# Patient Record
Sex: FEMALE | Race: WHITE | NOT HISPANIC OR LATINO | Employment: OTHER | URBAN - METROPOLITAN AREA
[De-identification: names, ages, dates, MRNs, and addresses within clinical notes are randomized per-mention and may not be internally consistent; named-entity substitution may affect disease eponyms.]

---

## 2017-03-31 DIAGNOSIS — M79.7 FIBROMYALGIA: ICD-10-CM

## 2017-03-31 DIAGNOSIS — M19.90 ARTHRITIS: ICD-10-CM

## 2017-04-03 DIAGNOSIS — M19.90 ARTHRITIS: ICD-10-CM

## 2017-04-03 DIAGNOSIS — M79.7 FIBROMYALGIA: ICD-10-CM

## 2017-04-03 RX ORDER — MELOXICAM 15 MG/1
15 TABLET ORAL DAILY
Qty: 30 TABLET | Refills: 1 | Status: SHIPPED | OUTPATIENT
Start: 2017-04-03 | End: 2018-11-19

## 2017-04-05 RX ORDER — MELOXICAM 15 MG/1
TABLET ORAL
Qty: 30 TABLET | Refills: 0 | Status: SHIPPED | OUTPATIENT
Start: 2017-04-05 | End: 2017-07-28 | Stop reason: SDUPTHER

## 2017-05-30 DIAGNOSIS — M19.90 ARTHRITIS: ICD-10-CM

## 2017-05-30 DIAGNOSIS — M79.7 FIBROMYALGIA: ICD-10-CM

## 2017-05-30 RX ORDER — VENLAFAXINE HYDROCHLORIDE 75 MG/1
CAPSULE, EXTENDED RELEASE ORAL
Qty: 30 CAPSULE | Refills: 3 | Status: SHIPPED | OUTPATIENT
Start: 2017-05-30 | End: 2017-11-06 | Stop reason: SDUPTHER

## 2017-06-27 RX ORDER — HYDROXYZINE HYDROCHLORIDE 25 MG/1
25 TABLET, FILM COATED ORAL EVERY 6 HOURS PRN
Qty: 30 TABLET | Refills: 1 | Status: SHIPPED | OUTPATIENT
Start: 2017-06-27 | End: 2018-05-01

## 2017-07-28 DIAGNOSIS — M19.90 ARTHRITIS: ICD-10-CM

## 2017-07-28 DIAGNOSIS — M79.7 FIBROMYALGIA: ICD-10-CM

## 2017-07-28 RX ORDER — MELOXICAM 15 MG/1
15 TABLET ORAL DAILY
Qty: 30 TABLET | Refills: 1 | Status: SHIPPED | OUTPATIENT
Start: 2017-07-28 | End: 2018-04-24 | Stop reason: SDUPTHER

## 2017-08-14 RX ORDER — ZOLPIDEM TARTRATE 10 MG/1
TABLET ORAL
Qty: 90 TABLET | Refills: 0 | Status: SHIPPED | OUTPATIENT
Start: 2017-08-14 | End: 2018-08-01 | Stop reason: SDUPTHER

## 2017-11-06 DIAGNOSIS — M79.7 FIBROMYALGIA: ICD-10-CM

## 2017-11-06 DIAGNOSIS — M19.90 ARTHRITIS: ICD-10-CM

## 2017-11-06 RX ORDER — VENLAFAXINE HYDROCHLORIDE 75 MG/1
CAPSULE, EXTENDED RELEASE ORAL
Qty: 30 CAPSULE | Refills: 1 | Status: SHIPPED | OUTPATIENT
Start: 2017-11-06 | End: 2017-12-18 | Stop reason: SDUPTHER

## 2017-12-18 DIAGNOSIS — M79.7 FIBROMYALGIA: ICD-10-CM

## 2017-12-18 DIAGNOSIS — M19.90 ARTHRITIS: ICD-10-CM

## 2017-12-18 RX ORDER — VENLAFAXINE HYDROCHLORIDE 75 MG/1
CAPSULE, EXTENDED RELEASE ORAL
Qty: 30 CAPSULE | Refills: 1 | Status: SHIPPED | OUTPATIENT
Start: 2017-12-18 | End: 2018-05-01

## 2018-04-24 ENCOUNTER — OFFICE VISIT (OUTPATIENT)
Dept: NEUROSURGERY | Facility: CLINIC | Age: 68
End: 2018-04-24

## 2018-04-24 VITALS
HEIGHT: 63 IN | SYSTOLIC BLOOD PRESSURE: 100 MMHG | BODY MASS INDEX: 29.23 KG/M2 | DIASTOLIC BLOOD PRESSURE: 56 MMHG | WEIGHT: 165 LBS

## 2018-04-24 DIAGNOSIS — M54.10 RADICULOPATHY OF LEG: Primary | ICD-10-CM

## 2018-04-24 PROCEDURE — 99203 OFFICE O/P NEW LOW 30 MIN: CPT | Performed by: NEUROLOGICAL SURGERY

## 2018-04-24 RX ORDER — GABAPENTIN 300 MG/1
CAPSULE ORAL
Qty: 120 CAPSULE | Refills: 3 | Status: SHIPPED | OUTPATIENT
Start: 2018-04-24 | End: 2018-05-01 | Stop reason: SDUPTHER

## 2018-04-24 NOTE — PROGRESS NOTES
Subjective   Patient ID: Malissa Silva is a 67 y.o. female who is being seen for consultation today at the request of Nestor Weeks MD for sciatica. She had an MRI of the lumbar at [facility] on 2/13/18. She presents unaccompanied.    History of Present Illness 68 yo lady with history of hip pain and LLE radiculopathy.  This started in December.  She went to the ER several times in last 4 months.  She reports it got better.  She received a medrol dose back with good efficacy.  She reports her pain is a 2-3/10.  SHe denies b/b issues.  She did not perform PT.  No ELENA's.  Her pain in her left leg is exacerbated by sitting.  She is retired.  She is a non smoker.  SHe reports no weakness.        The following portions of the patient's history were reviewed and updated as appropriate: allergies, current medications, past family history, past medical history, past social history, past surgical history and problem list.    Review of Systems   Constitutional: Negative for chills and fever.   Respiratory: Negative for cough and shortness of breath.    Cardiovascular: Negative for chest pain, palpitations and leg swelling.   Gastrointestinal: Negative for abdominal pain and constipation.   Genitourinary: Negative for difficulty urinating and enuresis.   Musculoskeletal: Positive for back pain. Negative for gait problem and neck pain.   Skin: Negative for rash.   Neurological: Negative for weakness and numbness (or tingling).   Hematological: Does not bruise/bleed easily.   Psychiatric/Behavioral: Negative for sleep disturbance.       Objective   Physical Exam   Constitutional: She is oriented to person, place, and time.   Neurological: She is oriented to person, place, and time. Gait normal.   Reflex Scores:       Tricep reflexes are 1+ on the right side and 1+ on the left side.       Bicep reflexes are 1+ on the right side and 1+ on the left side.       Brachioradialis reflexes are 1+ on the right side and 1+ on the left side.        Patellar reflexes are 1+ on the right side and 1+ on the left side.       Achilles reflexes are 1+ on the right side and 1+ on the left side.    Neurologic Exam     Mental Status   Oriented to person, place, and time.     Motor Exam   Muscle bulk: normal  Overall muscle tone: normal    Strength   Right deltoid: 5/5  Left deltoid: 5/5  Right biceps: 5/5  Left biceps: 5/5  Right triceps: 5/5  Left triceps: 5/5  Right wrist extension: 5/5  Left wrist extension: 5/5  Right interossei: 5/5  Left interossei: 5/5  Right iliopsoas: 5/5  Left iliopsoas: 5/5  Right quadriceps: 5/5  Left quadriceps: 5/5  Right hamstrin/5  Left hamstrin/5  Right anterior tibial: 5/5  Left anterior tibial: 5/5  Right gastroc: 5/5  Left gastroc: 5/5    Sensory Exam   Right arm light touch: normal  Left arm light touch: normal  Right leg light touch: normal  Left leg light touch: normal  Right arm pinprick: normal  Left arm pinprick: normal  Right leg pinprick: normal  Left leg pinprick: normal    Gait, Coordination, and Reflexes     Gait  Gait: normal    Reflexes   Right brachioradialis: 1+  Left brachioradialis: 1+  Right biceps: 1+  Left biceps: 1+  Right triceps: 1+  Left triceps: 1+  Right patellar: 1+  Left patellar: 1+  Right achilles: 1+  Left achilles: 1+  Right Polanco: absent  Left Polanco: absent  Right ankle clonus: absent  Left ankle clonus: absentNo SLR, no xSLR     Antalgic hips bilaterally  Pain with int/ext rotation of left hip      Assessment/Plan   Independent Review of Radiographic Studies:    SHe has a left L5S1 disc herniation which deforms her left S1 disc.  SHe has diffuse moderate ddd as well.    Medical Decision Making:  I offered her surgery for her disc herniation.  She has some symptoms but is improved.  She has clear hip pathology as well.  We discussed gabapentin as an alternative.  She is allergic to dye and if red she cannot get. We discussed red flags and reasons to call and seek eval.  SHe will  transition her care back to her PCP in terms of the gabapentin.      Malissa was seen today for back pain.    Diagnoses and all orders for this visit:    Radiculopathy of leg    Other orders  -     gabapentin (NEURONTIN) 300 MG capsule; Take 1 qhs for 3-5 days, then bid for 3-5 days, then tid and stay on this dose      No Follow-up on file.

## 2018-04-30 ENCOUNTER — TELEPHONE (OUTPATIENT)
Dept: NEUROSURGERY | Facility: CLINIC | Age: 68
End: 2018-04-30

## 2018-04-30 NOTE — TELEPHONE ENCOUNTER
"This patient saw Dr. Rain on 4/24/18 for Radiculopathy of leg. He Rx'd her Gabapentin 300MG. She is supposed to increase dosage to 300 MG TID after 1 week.     She said she has only been taking this medication for three days and she cannot tolerate it anymore. She said that it causes headaches. Her headaches are getting worse she describes it as feeling like she has a \"hangover\". She said based upon her symptoms now she does not think she'll be able to go up to the three times a day.    She was wondering if she is able to be switched to taking a lower dose of this med or if we have any other recommendations for her.     [phone]    "

## 2018-05-01 ENCOUNTER — OFFICE VISIT (OUTPATIENT)
Dept: FAMILY MEDICINE CLINIC | Facility: CLINIC | Age: 68
End: 2018-05-01

## 2018-05-01 VITALS
BODY MASS INDEX: 28.88 KG/M2 | TEMPERATURE: 98.2 F | SYSTOLIC BLOOD PRESSURE: 118 MMHG | OXYGEN SATURATION: 97 % | WEIGHT: 163 LBS | DIASTOLIC BLOOD PRESSURE: 60 MMHG | HEART RATE: 70 BPM | HEIGHT: 63 IN

## 2018-05-01 DIAGNOSIS — M54.10 RADICULOPATHY OF LEG: Primary | ICD-10-CM

## 2018-05-01 DIAGNOSIS — R05.9 COUGH: ICD-10-CM

## 2018-05-01 PROCEDURE — 99204 OFFICE O/P NEW MOD 45 MIN: CPT | Performed by: NURSE PRACTITIONER

## 2018-05-01 RX ORDER — GABAPENTIN 100 MG/1
CAPSULE ORAL
Qty: 90 CAPSULE | Refills: 0 | Status: SHIPPED | OUTPATIENT
Start: 2018-05-01 | End: 2018-11-19

## 2018-05-01 NOTE — PROGRESS NOTES
"Subjective   Malissa Silva is a 67 y.o. female who presents to [facility] as a new patient. Recently saw Dr Rain for back pain and was prescribed gabapentin but this is causing side effects. Was advised by Dr Rain to contact PCP for medication management.     History of Present Illness   Was taking gabapentin for unclear reasons, does have RLS and leg achiness, some low back pain. Reports headaches associated with gabapentin use, has had less overall pain since taking. Has been sensitive to medications. Had hip replaced 3 yrs ago. Still with hip pain bilateral. To see Y this week for further evaluation.   Was in good health until retired.   Yeast infection treated 3 weeks ago by gyn, told high blood sugar at the time. Doesn't eat sugar as hypoglycemic by history. Went to Dr. Gilbert and had lab work. Told normal.   Was seen in ER for sciatica, treated with steroids, just before gynecology appt.   Had experience with flu like symptoms after hot tub exposure. Had fever, managed with tylenol.    The following portions of the patient's history were reviewed and updated as appropriate: allergies, current medications, past family history, past medical history, past social history, past surgical history and problem list.    Review of Systems   Constitutional: Positive for fever. Negative for fatigue, unexpected weight gain and unexpected weight loss.   HENT: Positive for congestion. Negative for hearing loss, postnasal drip and rhinorrhea.    Respiratory: Positive for cough (productive).    Cardiovascular: Negative.    Endocrine: Negative.    Musculoskeletal: Positive for arthralgias, back pain and gait problem.   Hematological: Negative.    Psychiatric/Behavioral: Negative.      /60   Pulse 70   Temp 98.2 °F (36.8 °C) (Oral)   Ht 160 cm (63\")   Wt 73.9 kg (163 lb)   SpO2 97%   BMI 28.87 kg/m²     Objective   Physical Exam   Constitutional: She appears well-developed and well-nourished. No distress.   HENT: "   Right Ear: Tympanic membrane normal.   Left Ear: An impacted cerumen is present.  Nose: Nose normal.   Mouth/Throat: Uvula is midline. Posterior oropharyngeal erythema present. No posterior oropharyngeal edema.   Neck: Normal range of motion. Neck supple. No thyromegaly present.   Cardiovascular: Normal rate, regular rhythm and normal heart sounds.    Pulmonary/Chest: Effort normal and breath sounds normal.   Musculoskeletal:        Lumbar back: She exhibits decreased range of motion and pain. She exhibits no tenderness and no spasm.   SLR neg hardeep, -SI tenderness, negative piriformis to distraction.    Lymphadenopathy:     She has no cervical adenopathy.   Neurological: No sensory deficit. She exhibits normal muscle tone. Gait normal.   Reflex Scores:       Patellar reflexes are 2+ on the right side and 2+ on the left side.       Achilles reflexes are 2+ on the right side and 2+ on the left side.  Skin: Skin is warm and dry.   Psychiatric: She has a normal mood and affect. Her speech is normal and behavior is normal. Judgment and thought content normal.   Nursing note and vitals reviewed.    Assessment/Plan   Problems Addressed this Visit        Nervous and Auditory    Radiculopathy of leg - Primary      Other Visit Diagnoses     Cough            Cough, associates with hot tub use? Follow up, consider antibiotics if not settling  Hyperglycemia--sign release to get labs from Dr. Gilbert's office  Radiculopathy and widespread pain--sensitive to medications, start gabapentin at lower dose and follow up 1 month.     Review of imaging:    SHe has a left L5S1 disc herniation which deforms her left S1 disc.  SHe has diffuse moderate ddd as well.     Findings: Lt hip: No fracture, Normal alignment, No foreign body, No soft tissue swelling    Severe degenerative changes with superior acetabulum sclerosis and cystic change   Superior spurring     Rt hip shows intact and normal appearing hip implants     STEFANIE Report:    As  part of this patient's treatment plan, I am prescribing controlled substances. The patient has been made aware of appropriate use of such medications, including potential risk of somnolence, limited ability to drive and /or work safely, and potential for dependence or overdose. It has also been made clear that these medications are for use by this patient only, without concomitant use of alcohol or other substances unless prescribed.    Patient has completed prescribing agreement detailing terms of continued prescribing of controlled substances, including monitoring STEFANIE reports, urine drug screening, and pill counts if necessary. The patient is aware that inappropriate use will result in cessation of prescribing such medications.    STEFANIE report has been reviewed by: ALLISON Marte on 05/01/18.  The report was scanned into the patient's chart.    Date of last STEFANIE:  5/1/2018

## 2018-05-24 ENCOUNTER — OFFICE VISIT (OUTPATIENT)
Dept: FAMILY MEDICINE CLINIC | Facility: CLINIC | Age: 68
End: 2018-05-24

## 2018-05-24 VITALS
DIASTOLIC BLOOD PRESSURE: 76 MMHG | OXYGEN SATURATION: 97 % | HEART RATE: 72 BPM | BODY MASS INDEX: 29.23 KG/M2 | TEMPERATURE: 98 F | HEIGHT: 63 IN | WEIGHT: 165 LBS | SYSTOLIC BLOOD PRESSURE: 134 MMHG

## 2018-05-24 DIAGNOSIS — R73.9 HYPERGLYCEMIA: Primary | ICD-10-CM

## 2018-05-24 PROBLEM — G57.11 LATERAL FEMORAL CUTANEOUS NEUROPATHY, RIGHT: Status: ACTIVE | Noted: 2018-05-02

## 2018-05-24 PROBLEM — M25.552 LEFT HIP PAIN: Status: ACTIVE | Noted: 2018-05-02

## 2018-05-24 PROBLEM — M16.12 PRIMARY OSTEOARTHRITIS OF LEFT HIP: Status: ACTIVE | Noted: 2018-05-02

## 2018-05-24 LAB
ALBUMIN SERPL-MCNC: 4.2 G/DL (ref 3.5–5.2)
ALBUMIN/GLOB SERPL: 1.6 G/DL
ALP SERPL-CCNC: 54 U/L (ref 39–117)
ALT SERPL-CCNC: 22 U/L (ref 1–33)
AST SERPL-CCNC: 18 U/L (ref 1–32)
BILIRUB SERPL-MCNC: 0.3 MG/DL (ref 0.1–1.2)
BUN SERPL-MCNC: 17 MG/DL (ref 8–23)
BUN/CREAT SERPL: 19.8 (ref 7–25)
CALCIUM SERPL-MCNC: 9.4 MG/DL (ref 8.6–10.5)
CHLORIDE SERPL-SCNC: 101 MMOL/L (ref 98–107)
CO2 SERPL-SCNC: 25.9 MMOL/L (ref 22–29)
CREAT SERPL-MCNC: 0.86 MG/DL (ref 0.57–1)
GFR SERPLBLD CREATININE-BSD FMLA CKD-EPI: 66 ML/MIN/1.73
GFR SERPLBLD CREATININE-BSD FMLA CKD-EPI: 80 ML/MIN/1.73
GLOBULIN SER CALC-MCNC: 2.7 GM/DL
GLUCOSE SERPL-MCNC: 89 MG/DL (ref 65–99)
HBA1C MFR BLD: 5.3 % (ref 4.8–5.6)
POTASSIUM SERPL-SCNC: 4.2 MMOL/L (ref 3.5–5.2)
PROT SERPL-MCNC: 6.9 G/DL (ref 6–8.5)
SODIUM SERPL-SCNC: 141 MMOL/L (ref 136–145)

## 2018-05-24 PROCEDURE — 99214 OFFICE O/P EST MOD 30 MIN: CPT | Performed by: NURSE PRACTITIONER

## 2018-05-24 NOTE — PROGRESS NOTES
"Subjective   Malissa Silva is a 67 y.o. female. Recurring yeast infections and obgyn believes it may be due to hyperglycemia     History of Present Illness   Had blood work at gyn, had further blood work at [facility], got results told normal. Has horrible yeast infection, can't take fluconazole as highly allergic to red dye. Infection resolved with terconazole intravaginally. Surprised has sugar issues as avoids soft drinks, avoids concentrated sweets.     Having pain, can't sleep. Takes 1/2 ambien at night to sleep. Pain is knees down to shins, deep inside, additionally to thighs. Does exercise, more of late. Hurts whether exercises or not. For a while took HC at night, would help her sleep. Does not want to depend on medication, wants to get rid of pain. Had pain even before hip replacement. Has additional hip replacement scheduled for July. Taking gabapentin, does help during the day, helps with hip pain, not all over pain at night. Had bursitis injection(helpful for 3 days only)  The following portions of the patient's history were reviewed and updated as appropriate: allergies, current medications, past family history, past medical history, past social history, past surgical history and problem list.    Review of Systems   Constitutional: Negative.    Respiratory: Negative.    Cardiovascular: Negative.    Musculoskeletal: Positive for arthralgias and bursitis. Negative for myalgias.   Neurological: Negative.    Hematological: Negative.    Psychiatric/Behavioral: Positive for sleep disturbance. Negative for depressed mood.     /76   Pulse 72   Temp 98 °F (36.7 °C) (Oral)   Ht 160 cm (62.99\")   Wt 74.8 kg (165 lb)   SpO2 97%   BMI 29.24 kg/m²     Objective   Physical Exam   Constitutional: She appears well-developed and well-nourished.   Cardiovascular: Normal rate, regular rhythm and normal heart sounds.    Pulses:       Dorsalis pedis pulses are 2+ on the right side, and 2+ on the left side. "   Pulmonary/Chest: Effort normal and breath sounds normal.   Musculoskeletal:        Right knee: Normal.        Left knee: Normal.        Right lower leg: She exhibits no tenderness and no edema.        Left lower leg: She exhibits no tenderness and no edema.   Skin: Skin is warm and dry.     Assessment/Plan   Problems Addressed this Visit     None      Visit Diagnoses     Hyperglycemia    -  Primary    Relevant Orders    Hemoglobin A1c    Comprehensive Metabolic Panel        Suspect FMS, Declines increase on gabapentin, reports no side effects, taking 1 at night only. Could consider low dose elavil as well. Hold changes for now.   As unable to get labs from Dr. Gilbert's office, add CMP, A1c  May need to consider repeating terconazole if yeast recurs. Continue work with gyn.

## 2018-07-02 ENCOUNTER — HOSPITAL ENCOUNTER (OUTPATIENT)
Dept: CT IMAGING | Facility: HOSPITAL | Age: 68
Discharge: HOME | End: 2018-07-02
Admitting: NURSE PRACTITIONER

## 2018-07-02 ENCOUNTER — OFFICE VISIT (OUTPATIENT)
Dept: FAMILY MEDICINE CLINIC | Facility: CLINIC | Age: 68
End: 2018-07-02

## 2018-07-02 VITALS
DIASTOLIC BLOOD PRESSURE: 76 MMHG | OXYGEN SATURATION: 97 % | TEMPERATURE: 98.5 F | HEART RATE: 80 BPM | SYSTOLIC BLOOD PRESSURE: 142 MMHG | BODY MASS INDEX: 29.23 KG/M2 | WEIGHT: 165 LBS | HEIGHT: 63 IN

## 2018-07-02 DIAGNOSIS — H66.92 LEFT OTITIS MEDIA, UNSPECIFIED OTITIS MEDIA TYPE: ICD-10-CM

## 2018-07-02 DIAGNOSIS — H61.22 IMPACTED CERUMEN OF LEFT EAR: ICD-10-CM

## 2018-07-02 DIAGNOSIS — R51.9 SEVERE HEADACHE: Primary | ICD-10-CM

## 2018-07-02 PROCEDURE — 69209 REMOVE IMPACTED EAR WAX UNI: CPT | Performed by: NURSE PRACTITIONER

## 2018-07-02 PROCEDURE — 99213 OFFICE O/P EST LOW 20 MIN: CPT | Performed by: NURSE PRACTITIONER

## 2018-07-02 PROCEDURE — 70450 CT HEAD/BRAIN W/O DYE: CPT

## 2018-07-02 RX ORDER — ACETAMINOPHEN,DIPHENHYDRAMINE HCL 500; 25 MG/1; MG/1
1 TABLET, FILM COATED ORAL NIGHTLY PRN
COMMUNITY
End: 2018-08-01

## 2018-07-02 RX ORDER — OMEGA-3 FATTY ACIDS/FISH OIL 300-1000MG
CAPSULE ORAL 2 TIMES DAILY PRN
COMMUNITY
End: 2018-11-19

## 2018-07-02 RX ORDER — CIPROFLOXACIN 750 MG/1
750 TABLET, FILM COATED ORAL EVERY 12 HOURS SCHEDULED
Qty: 14 TABLET | Refills: 0 | Status: SHIPPED | OUTPATIENT
Start: 2018-07-02 | End: 2018-08-01

## 2018-07-02 NOTE — PROGRESS NOTES
Subjective   Malissa Silva is a 67 y.o. female. Headache, earache, sore throat, she has been taking advil and tylenol OTC. Started last Wed with sore throat and took some tylenol and gargled with salt water. Progressed with the headache then left ear pain. The pain is like an electoral shock in and behind the left ear. She denies loss of hearing. The headache started behind the left ear and the just over the ear. It's intermittent in nature but sharp and stabbing. She has no history of migraines and states she rarely has headaches at all.     Earache    There is pain in the left ear. This is a new problem. The current episode started in the past 7 days. The problem occurs constantly. The problem has been gradually worsening. There has been no fever. The pain is at a severity of 7/10. Associated symptoms include headaches and a sore throat. Treatments tried: Olive oil, Advil, Tylenol. The treatment provided no relief. There is no history of hearing loss.   Headache    This is a new problem. The current episode started in the past 7 days. The problem occurs constantly. The problem has been gradually worsening. The pain is located in the left unilateral region. The pain quality is not similar to prior headaches. The quality of the pain is described as shooting and stabbing. Pain scale: from 7-10. Associated symptoms include ear pain (left) and a sore throat. Pertinent negatives include no dizziness, fever or weakness. Nothing aggravates the symptoms. She has tried acetaminophen and NSAIDs for the symptoms. The treatment provided mild relief. There is no history of migraine headaches.        The following portions of the patient's history were reviewed and updated as appropriate: allergies, current medications, past family history, past medical history, past social history, past surgical history and problem list.    Review of Systems   Constitutional: Negative for chills and fever.   HENT: Positive for congestion, ear  "pain (left) and sore throat.    Respiratory: Negative.    Cardiovascular: Negative.    Neurological: Positive for headache. Negative for dizziness, tremors, syncope, facial asymmetry, speech difficulty, weakness, light-headedness and confusion.       Objective     Physical Exam   Constitutional: She is oriented to person, place, and time. Vital signs are normal. She appears well-developed and well-nourished. She is cooperative.   HENT:   Right Ear: Hearing and tympanic membrane normal.   Left Ear: Hearing normal.   Nose: Nose normal.   Mouth/Throat: Uvula is midline, oropharynx is clear and moist and mucous membranes are normal.   Unable to visualize the TM on the left due to cerumen impaction.  Irrigated and rechecked    Cardiovascular: Normal rate, regular rhythm and normal heart sounds.    Pulmonary/Chest: Effort normal and breath sounds normal.   Neurological: She is alert and oriented to person, place, and time. She has normal strength. No cranial nerve deficit or sensory deficit.   Psychiatric: She has a normal mood and affect. Her speech is normal and behavior is normal. Judgment and thought content normal. Cognition and memory are normal.   Nursing note and vitals reviewed.    Vitals:    07/02/18 1421   BP: 142/76   Pulse: 80   Temp: 98.5 °F (36.9 °C)   TempSrc: Oral   SpO2: 97%   Weight: 74.8 kg (165 lb)   Height: 160 cm (62.99\")   Ear Cerumen Removal Instrumentation  Date/Time: 7/2/2018 2:36 PM  Performed by: WALLACE JORGE  Authorized by: WALLACE JORGE   Consent: Verbal consent obtained. Written consent not obtained.  Risks and benefits: risks, benefits and alternatives were discussed  Consent given by: patient  Patient understanding: patient states understanding of the procedure being performed  Patient consent: the patient's understanding of the procedure matches consent given  Required items: required blood products, implants, devices, and special equipment available  Patient identity " confirmed: verbally with patient  Ceruminolytics applied: Ceruminolytics applied prior to the procedure.  Location details: left ear  Patient tolerance: Patient tolerated the procedure well with no immediate complications  Procedure type: irrigation   Sedation:  Patient sedated: no        Assessment/Plan   Diagnoses and all orders for this visit:    Severe headache  -     CT Head Without Contrast    Impacted cerumen of left ear  -     Ear Cerumen Removal    Left otitis media, unspecified otitis media type  -     ciprofloxacin (CIPRO) 750 MG tablet; Take 1 tablet by mouth Every 12 (Twelve) Hours.    Will treat OM but doubtful this is cause of her head pain. Since new onset and never had headaches like this before will scan head.   Continue Tylenol and Advil as needed.  RTC if not improving      Addendom: Rec'd call from Dr. Leiva radiologist- HCT with some small vessel disease c/w age but nothing acute on exam. Patient was notified and advised if any neurological changes she should go to the ED.

## 2018-08-01 ENCOUNTER — OFFICE VISIT (OUTPATIENT)
Dept: FAMILY MEDICINE CLINIC | Facility: CLINIC | Age: 68
End: 2018-08-01

## 2018-08-01 VITALS
BODY MASS INDEX: 29.95 KG/M2 | HEART RATE: 70 BPM | OXYGEN SATURATION: 96 % | HEIGHT: 63 IN | WEIGHT: 169 LBS | DIASTOLIC BLOOD PRESSURE: 68 MMHG | TEMPERATURE: 98.3 F | SYSTOLIC BLOOD PRESSURE: 128 MMHG

## 2018-08-01 DIAGNOSIS — M79.7 FIBROMYALGIA AFFECTING LOWER LEG: Primary | ICD-10-CM

## 2018-08-01 DIAGNOSIS — G47.00 INSOMNIA, UNSPECIFIED TYPE: ICD-10-CM

## 2018-08-01 DIAGNOSIS — R53.82 CHRONIC FATIGUE: ICD-10-CM

## 2018-08-01 PROCEDURE — 99214 OFFICE O/P EST MOD 30 MIN: CPT | Performed by: NURSE PRACTITIONER

## 2018-08-01 RX ORDER — AMITRIPTYLINE HYDROCHLORIDE 10 MG/1
10 TABLET, FILM COATED ORAL NIGHTLY
Qty: 30 TABLET | Refills: 0 | Status: SHIPPED | OUTPATIENT
Start: 2018-08-01 | End: 2018-08-28 | Stop reason: SDUPTHER

## 2018-08-01 RX ORDER — ZOLPIDEM TARTRATE 10 MG/1
10 TABLET ORAL NIGHTLY
Qty: 30 TABLET | Refills: 3 | Status: SHIPPED | OUTPATIENT
Start: 2018-08-01 | End: 2018-10-23 | Stop reason: SDUPTHER

## 2018-08-01 NOTE — PROGRESS NOTES
Subjective   Malissa Silva is a 68 y.o. female who presents c/o widespread pain.     History of Present Illness   Reports here today for ambien refills. Takes nightly, tried to skip a few nights and couldn't sleep at all. Takes 5mg nightly. If wakes up, knows something else going on. Every few weeks wakes with middle of night pain. Has leftover oxycodone, does help pain, but messes with her brain. Feels pain building up over time. All over pain. Just tries to work through the pain. Sleeps really well when takes oxycodone. Makes pain start to subside. 1/2 pills at a time. Has prolonged fatigue. Legs hurt from knees to ankles during the evening, like a muscle pain.     Complaints of ear fullness, as was full of wax last time in.     Still having leg pain, was scheduled for hip replacement in July. Cancelled surgery as scared to have replaced. Has chronic pain R hip post THR. Has rescheduled L hip for October.     Surprised had diagnosis of fibromyalgia, was on effexor for perhaps 1 yr. Did seem to help a little bit. Had brain zaps when off.     Knot in L armpit, going away.     Every morning for 8-9 years, cough is productive from the lungs, increased in the last week. Would recommend CXR to rule out more serious. Cough better in the last few days.   The following portions of the patient's history were reviewed and updated as appropriate: allergies, current medications, past family history, past medical history, past social history, past surgical history and problem list.    Review of Systems   Constitutional: Positive for fatigue. Negative for activity change, appetite change, fever, unexpected weight gain and unexpected weight loss.   HENT: Negative.  Negative for congestion.    Respiratory: Positive for cough. Negative for shortness of breath and wheezing.    Cardiovascular: Negative.  Negative for leg swelling.   Gastrointestinal: Negative for indigestion.   Genitourinary: Negative for breast lump and breast  "pain.   Musculoskeletal: Positive for arthralgias, gait problem and myalgias. Negative for back pain and joint swelling.   Skin: Negative.    Neurological: Negative for dizziness.   Hematological: Positive for adenopathy.   Psychiatric/Behavioral: Positive for sleep disturbance. Negative for self-injury and depressed mood (had an episode in 50s, so knows what this is like). The patient is not nervous/anxious.      /68   Pulse 70   Temp 98.3 °F (36.8 °C) (Oral)   Ht 160 cm (63\")   Wt 76.7 kg (169 lb)   SpO2 96%   BMI 29.94 kg/m²     Objective   Physical Exam   Constitutional: She appears well-developed and well-nourished.   Neck: Normal range of motion. Neck supple. No thyromegaly present.   Cardiovascular: Normal rate, regular rhythm and normal heart sounds.    Pulmonary/Chest: Effort normal and breath sounds normal.   Musculoskeletal:        Right hip: She exhibits tenderness (in femoral cutaneous nerve distribution).        Left hip: She exhibits decreased range of motion and bony tenderness.   Te point exam negative   Lymphadenopathy:     She has no cervical adenopathy.     She has no axillary adenopathy.   Neurological: Gait abnormal.   Skin: Skin is warm and dry.   Psychiatric: She has a normal mood and affect. Her behavior is normal. Judgment and thought content normal.   Nursing note and vitals reviewed.    Assessment/Plan   Problems Addressed this Visit        Other    Fatigue    Insomnia      Other Visit Diagnoses     Fibromyalgia affecting lower leg    -  Primary    Relevant Medications    amitriptyline (ELAVIL) 10 MG tablet        FMS?--Te point exam negative, but does have widespread pain. Previously diagnosed by Dr. Sales. Recommend restart effexor or amitriptyline for widespread pain episodes. Doubt role of oxycodone. Encouraged exercise to treat.  Insomnia--Ok to continue ambien at 1/2 pill daily. No aberrant activities. Encouraged not to mix alcohol and zolpidem. Discussed aberrant " behaviors that can occur with zolpidem.   Cough--recommend CXR, she declines today, as will have when goes for preop clearance.  Had full labs in May at Dr. Gilbert, consider repeat in November.     STEFANIE Report:    As part of this patient's treatment plan, I am prescribing controlled substances. The patient has been made aware of appropriate use of such medications, including potential risk of somnolence, limited ability to drive and /or work safely, and potential for dependence or overdose. It has also been made clear that these medications are for use by this patient only, without concomitant use of alcohol or other substances unless prescribed.    Patient has completed prescribing agreement detailing terms of continued prescribing of controlled substances, including monitoring STEFANIE reports, urine drug screening, and pill counts if necessary. The patient is aware that inappropriate use will result in cessation of prescribing such medications.    STEFANIE report has been ordered and reviewed by: ALLISON Marte on 08/02/18.  The report was scanned into the patient's chart.    Date of last STEFANIE:  8/1/18    History and physical exam exhibit continued safe and appropriate use of controlled substances.

## 2018-08-28 DIAGNOSIS — M79.7 FIBROMYALGIA AFFECTING LOWER LEG: ICD-10-CM

## 2018-08-29 ENCOUNTER — APPOINTMENT (OUTPATIENT)
Dept: WOMENS IMAGING | Facility: HOSPITAL | Age: 68
End: 2018-08-29

## 2018-08-29 PROCEDURE — 77067 SCR MAMMO BI INCL CAD: CPT | Performed by: RADIOLOGY

## 2018-08-29 PROCEDURE — 77063 BREAST TOMOSYNTHESIS BI: CPT | Performed by: RADIOLOGY

## 2018-08-29 RX ORDER — AMITRIPTYLINE HYDROCHLORIDE 10 MG/1
10 TABLET, FILM COATED ORAL NIGHTLY
Qty: 30 TABLET | Refills: 0 | Status: SHIPPED | OUTPATIENT
Start: 2018-08-29 | End: 2018-11-19

## 2018-09-10 ENCOUNTER — APPOINTMENT (OUTPATIENT)
Dept: WOMENS IMAGING | Facility: HOSPITAL | Age: 68
End: 2018-09-10

## 2018-09-10 PROCEDURE — 77066 DX MAMMO INCL CAD BI: CPT | Performed by: RADIOLOGY

## 2018-09-10 PROCEDURE — 76641 ULTRASOUND BREAST COMPLETE: CPT | Performed by: RADIOLOGY

## 2018-09-10 PROCEDURE — G0279 TOMOSYNTHESIS, MAMMO: HCPCS | Performed by: RADIOLOGY

## 2018-09-10 PROCEDURE — MDREVIEWSP: Performed by: RADIOLOGY

## 2018-10-23 DIAGNOSIS — Z79.899 HIGH RISK MEDICATION USE: Primary | ICD-10-CM

## 2018-10-24 ENCOUNTER — TELEPHONE (OUTPATIENT)
Dept: FAMILY MEDICINE CLINIC | Facility: CLINIC | Age: 68
End: 2018-10-24

## 2018-10-24 RX ORDER — ZOLPIDEM TARTRATE 10 MG/1
TABLET ORAL
Qty: 30 TABLET | Refills: 0 | Status: SHIPPED | OUTPATIENT
Start: 2018-10-24 | End: 2019-03-26 | Stop reason: SDUPTHER

## 2018-10-24 NOTE — TELEPHONE ENCOUNTER
Patient informed and will come by to sign NA and leave UDS. She just had hip replacement and is being prescribed hydrocodone FYI.

## 2018-11-01 LAB
AMPHETAMINES UR QL SCN: NEGATIVE NG/ML
BARBITURATES UR QL SCN: NEGATIVE NG/ML
BENZODIAZ UR QL SCN: NEGATIVE NG/ML
BZE UR QL SCN: NEGATIVE NG/ML
CANNABINOIDS UR QL SCN: NEGATIVE NG/ML
CREAT UR-MCNC: 89.1 MG/DL (ref 20–300)
FENTANYL+NORFENTANYL UR QL SCN: NEGATIVE PG/ML
LABORATORY COMMENT REPORT: ABNORMAL
MEPERIDINE UR QL: NEGATIVE NG/ML
METHADONE UR QL SCN: NEGATIVE NG/ML
OPIATES UR QL SCN: POSITIVE NG/ML
OXYCODONE+OXYMORPHONE UR QL SCN: NEGATIVE NG/ML
PCP UR QL: NEGATIVE NG/ML
PH UR: 5.7 [PH] (ref 4.5–8.9)
PROPOXYPH UR QL SCN: NEGATIVE NG/ML
SP GR UR: 1.01
TRAMADOL UR QL SCN: NEGATIVE NG/ML

## 2018-11-19 ENCOUNTER — OFFICE VISIT (OUTPATIENT)
Dept: FAMILY MEDICINE CLINIC | Facility: CLINIC | Age: 68
End: 2018-11-19

## 2018-11-19 VITALS
HEART RATE: 74 BPM | TEMPERATURE: 98.5 F | OXYGEN SATURATION: 95 % | HEIGHT: 63 IN | BODY MASS INDEX: 28.53 KG/M2 | DIASTOLIC BLOOD PRESSURE: 78 MMHG | WEIGHT: 161 LBS | SYSTOLIC BLOOD PRESSURE: 124 MMHG

## 2018-11-19 DIAGNOSIS — G57.11 LATERAL FEMORAL CUTANEOUS NEUROPATHY, RIGHT: Primary | ICD-10-CM

## 2018-11-19 DIAGNOSIS — M25.579 PAIN IN JOINT INVOLVING ANKLE AND FOOT, UNSPECIFIED LATERALITY: ICD-10-CM

## 2018-11-19 PROCEDURE — 99213 OFFICE O/P EST LOW 20 MIN: CPT | Performed by: NURSE PRACTITIONER

## 2018-11-19 NOTE — PROGRESS NOTES
"Subjective   Malissa Silva is a 68 y.o. female who presents c/o pain in both ankles and feet. Had left hip replacement recently and right hip several years ago. Had pain in feet prior to surgery but after most recent surgery pain has worsened.     History of Present Illness   Pain in ankles and feet occurring to lower 1/3 of shins, nightly, overwhelming occurring to the top of the feet as well. Feet feel hot and cold at the same times. Trying to wean self off painkillers, but not off. Takes pain pill in the evening before bed. Sick of taking pain pills. Doing physical therapy 2 days weekly and no pain. Only hurts in the evenings. Taking 1/2 pain pills at the time.   The following portions of the patient's history were reviewed and updated as appropriate: allergies, current medications, past family history, past medical history, past social history, past surgical history and problem list.    Review of Systems   Constitutional: Negative for activity change, appetite change, fatigue, unexpected weight gain and unexpected weight loss.   Respiratory: Negative.  Negative for shortness of breath.    Cardiovascular: Negative.  Negative for chest pain, palpitations and leg swelling.   Musculoskeletal: Positive for arthralgias and myalgias. Negative for back pain, gait problem and joint swelling.   Hematological: Negative.    Psychiatric/Behavioral: Positive for sleep disturbance. The patient is nervous/anxious.      /78   Pulse 74   Temp 98.5 °F (36.9 °C) (Oral)   Ht 160 cm (63\")   Wt 73 kg (161 lb)   SpO2 95%   BMI 28.52 kg/m²     Objective   Physical Exam   Constitutional: She appears well-developed and well-nourished. No distress.   Cardiovascular: Normal rate.   Pulses:       Popliteal pulses are 2+ on the right side, and 2+ on the left side.        Dorsalis pedis pulses are 1+ on the right side, and 1+ on the left side.   Pulmonary/Chest: Effort normal.   Musculoskeletal:        Right ankle: Normal.        " Left ankle: Normal.        Right foot: Normal.        Left foot: Normal.   Fully unable to elicit the pain.   Skin: Skin is warm and dry.   Psychiatric: Her behavior is normal. Judgment and thought content normal. Her affect is angry.   Nursing note and vitals reviewed.    Assessment/Plan   Problems Addressed this Visit        Nervous and Auditory    Lateral femoral cutaneous neuropathy, right - Primary      Other Visit Diagnoses     Pain in joint involving ankle and foot, unspecified laterality            Restart elavil to address pain in feet. FU 1 month, to assess response.

## 2018-11-21 DIAGNOSIS — M79.7 FIBROMYALGIA AFFECTING LOWER LEG: ICD-10-CM

## 2018-11-21 RX ORDER — AMITRIPTYLINE HYDROCHLORIDE 10 MG/1
10 TABLET, FILM COATED ORAL NIGHTLY
Qty: 30 TABLET | Refills: 0 | Status: SHIPPED | OUTPATIENT
Start: 2018-11-21 | End: 2019-03-26

## 2019-01-02 ENCOUNTER — TELEPHONE (OUTPATIENT)
Dept: FAMILY MEDICINE CLINIC | Facility: CLINIC | Age: 69
End: 2019-01-02

## 2019-03-21 RX ORDER — ZOLPIDEM TARTRATE 10 MG/1
TABLET ORAL
Qty: 30 TABLET | Refills: 0 | OUTPATIENT
Start: 2019-03-21

## 2019-03-26 ENCOUNTER — OFFICE VISIT (OUTPATIENT)
Dept: FAMILY MEDICINE CLINIC | Facility: CLINIC | Age: 69
End: 2019-03-26

## 2019-03-26 VITALS
WEIGHT: 170 LBS | HEART RATE: 72 BPM | TEMPERATURE: 98.7 F | BODY MASS INDEX: 30.12 KG/M2 | SYSTOLIC BLOOD PRESSURE: 128 MMHG | HEIGHT: 63 IN | DIASTOLIC BLOOD PRESSURE: 64 MMHG | OXYGEN SATURATION: 95 %

## 2019-03-26 DIAGNOSIS — Z51.81 ENCOUNTER FOR THERAPEUTIC DRUG LEVEL MONITORING: ICD-10-CM

## 2019-03-26 DIAGNOSIS — K52.1 DIARRHEA DUE TO DRUG: ICD-10-CM

## 2019-03-26 DIAGNOSIS — Z13.220 LIPID SCREENING: ICD-10-CM

## 2019-03-26 DIAGNOSIS — Z11.59 NEED FOR HEPATITIS C SCREENING TEST: ICD-10-CM

## 2019-03-26 DIAGNOSIS — G47.00 INSOMNIA, UNSPECIFIED TYPE: Primary | ICD-10-CM

## 2019-03-26 PROCEDURE — 99214 OFFICE O/P EST MOD 30 MIN: CPT | Performed by: NURSE PRACTITIONER

## 2019-03-26 PROCEDURE — G0438 PPPS, INITIAL VISIT: HCPCS | Performed by: NURSE PRACTITIONER

## 2019-03-26 RX ORDER — MELOXICAM 7.5 MG/1
7.5 TABLET ORAL DAILY
Qty: 90 TABLET | Refills: 3 | Status: SHIPPED | OUTPATIENT
Start: 2019-03-26 | End: 2020-01-08

## 2019-03-26 RX ORDER — ZOLPIDEM TARTRATE 10 MG/1
10 TABLET ORAL NIGHTLY PRN
Qty: 30 TABLET | Refills: 5 | Status: SHIPPED | OUTPATIENT
Start: 2019-03-26 | End: 2020-01-08 | Stop reason: SDUPTHER

## 2019-03-26 NOTE — PROGRESS NOTES
"Subjective   Malissa Silva is a 68 y.o. female who presents for a routine follow up on insomnia. Also has had diarrhea for 2 days.     History of Present Illness   Insomnia long term, takes 5 mg ambien, reports sleep is getting worse, associates with stress of caring for mother in law, 91, 2 doors down. Emotionally and mentally exhausting. Family members alternate care. Wakes after ambien wears off. Has had to take other half of ambien rarely. Has been allowing plenty of hours for sleep. Had testing at dermatology 2 weeks ago, has lesions to DEANNA, has been applying Hydrocortisone to area. Is allergic to trees. Environmental allergies are newer, has always had food allergies  Is exercising, but struggling with gaining weight. Diet is good, cannot eat sugar, slows metabolism down. Drinks 1 soft drink weekly. Makes green smoothies. Typically loses weight when has to have surgery.   Diarrhea x 2 days, no nausea, or vomiting, no different dietary choices, did have tuna salad at [facility] a few days ago. Exercises 2 x weekly.   The following portions of the patient's history were reviewed and updated as appropriate: allergies, current medications, past family history, past medical history, past social history, past surgical history and problem list.    Review of Systems   Constitutional: Positive for appetite change, fatigue and unexpected weight gain. Negative for activity change.   HENT: Negative.    Respiratory: Negative.    Cardiovascular: Negative.    Gastrointestinal: Positive for diarrhea. Negative for abdominal pain, nausea, vomiting and GERD.   Musculoskeletal: Positive for arthralgias.   Psychiatric/Behavioral: Positive for sleep disturbance and stress.     /64   Pulse 72   Temp 98.7 °F (37.1 °C) (Oral)   Ht 160 cm (63\")   Wt 77.1 kg (170 lb)   SpO2 95%   BMI 30.11 kg/m²     Objective   Physical Exam   Constitutional: She appears well-developed and well-nourished. No distress.   Neck: Normal range " of motion. Neck supple. No thyromegaly present.   Cardiovascular: Normal rate, regular rhythm and normal heart sounds.   Pulmonary/Chest: Effort normal and breath sounds normal.   Abdominal: Soft. Bowel sounds are normal. She exhibits no distension and no mass. There is no tenderness. There is no rebound and no guarding.   Lymphadenopathy:     She has no cervical adenopathy.   Skin: Skin is warm and dry. She is not diaphoretic.   Psychiatric: She has a normal mood and affect. Her behavior is normal. Judgment and thought content normal.   Nursing note and vitals reviewed.    Assessment/Plan   Problems Addressed this Visit        Other    Insomnia - Primary    Relevant Orders    ToxASSURE Select 13 (MW) - Urine, Clean Catch    Lipid screening    Relevant Orders    Lipid Panel    Need for hepatitis C screening test    Relevant Orders    Hepatitis C Antibody      Other Visit Diagnoses     Encounter for therapeutic drug level monitoring         Relevant Orders    ToxASSURE Select 13 (MW) - Urine, Clean Catch    Diarrhea due to drug            Insomnia--Discussed dangers of taking ambien with not enough time for rest, aberrant behaviors possible with ambien, desires to continue. Discussed potential of trial for XR   Lipid screening   Screen for Hep C  Diarrhea likely associated with the magnesium supplement she started taking earlier this week for leg cramping. To back off and see if resolves.         STEFANIE Report:      As part of this patient's treatment plan, I am prescribing controlled substances. The patient has been made aware of appropriate use of such medications, including potential risk of somnolence, limited ability to drive and /or work safely, and potential for dependence or overdose. It has also been made clear that these medications are for use by this patient only, without concomitant use of alcohol or other substances unless prescribed.    Patient has completed prescribing agreement detailing terms of  continued prescribing of controlled substances, including monitoring STEFANIE reports, urine drug screening, and pill counts if necessary. The patient is aware that inappropriate use will result in cessation of prescribing such medications.    STEFANIE report has been reviewed by: ALLISON Marte on 03/27/19.  The report was scanned into the patient's chart.    Date of last STEFANIE:  3/26/2019    History and physical exam exhibit continued safe and appropriate use of controlled substances.

## 2019-03-26 NOTE — PROGRESS NOTES
Initial Medicare Wellness Visit   The ABC's of the Annual Wellness Visit    No chief complaint on file.      HPI:  Malissa Silva, -1950, is a 68 y.o. female who presents for an Initial Medicare Wellness Visit.    Recent Hospitalizations:  Recently treated at the following:  Other: [facility].    Current Medical Providers:  Patient Care Team:  Anna Harding APRN as PCP - General (Family Medicine)  Anjelica Sepulveda MD as PCP - Claims Attributed    Health Habits and Functional and Cognitive Screening and Depression Screening:  Functional & Cognitive Status 3/26/2019   Do you have difficulty preparing food and eating? No   Do you have difficulty bathing yourself, getting dressed or grooming yourself? No   Do you have difficulty using the toilet? No   Do you have difficulty moving around from place to place? No   Do you have trouble with steps or getting out of a bed or a chair? No   In the past year have you fallen or experienced a near fall? No   Current Diet Well Balanced Diet   Dental Exam Up to date   Eye Exam Up to date   Exercise (times per week) 2 times per week   Current Exercise Activities Include Cardiovasular Workout on Exercise Equipment   Do you need help using the phone?  No   Are you deaf or do you have serious difficulty hearing?  No   Do you need help with transportation? No   Do you need help shopping? No   Do you need help preparing meals?  No   Do you need help with housework?  No   Do you need help with laundry? No   Do you need help taking your medications? No   Do you need help managing money? No   Do you ever drive or ride in a car without wearing a seat belt? No   Have you felt unusual stress, anger or loneliness in the last month? Yes   Who do you live with? Spouse   If you need help, do you have trouble finding someone available to you? No   Have you been bothered in the last four weeks by sexual problems? No   Do you have difficulty concentrating, remembering or making  decisions? No       Compared to one year ago, the patient feels her physical health is worse and her mental health is worse.    Depression Screen:  PHQ-2/PHQ-9 Depression Screening 3/26/2019   Little interest or pleasure in doing things 0   Feeling down, depressed, or hopeless 0   Trouble falling or staying asleep, or sleeping too much 3   Feeling tired or having little energy 3   Poor appetite or overeating 0   Feeling bad about yourself - or that you are a failure or have let yourself or your family down 0   Trouble concentrating on things, such as reading the newspaper or watching television 0   Moving or speaking so slowly that other people could have noticed. Or the opposite - being so fidgety or restless that you have been moving around a lot more than usual 0   Thoughts that you would be better off dead, or of hurting yourself in some way 0   Total Score 6   If you checked off any problems, how difficult have these problems made it for you to do your work, take care of things at home, or get along with other people? Not difficult at all         Past Medical/Family/Social History:  The following portions of the patient's history were reviewed and updated as appropriate: allergies, current medications, past family history, past medical history, past social history, past surgical history and problem list.    Allergies   Allergen Reactions   • Iodinated Diagnostic Agents Anaphylaxis     Refuses them as afraid of reaction   • Azithromycin    • Banana Other (See Comments)     Per allergy testing   • Cantaloupe (Diagnostic) Other (See Comments)     Per allergy testing   • Chocolate Other (See Comments)     Per allergy testing   • Cinnamon Other (See Comments)     Per allergy testing   • Citrullus Vulgaris Other (See Comments)     Per allergy testing   • Clindamycin    • Daucus Carota Other (See Comments)   • Epinephrine Other (See Comments)     syncope   • Penicillins Hives     Allergic to any meds w/ red, pink,  "lavender, orange dye.   • Pineapple Other (See Comments)     Per allergy testing   • Red Dye    • Soybean Oil Itching   • Tetanus Immune Globulin    • Tylenol Pm Extra [Diphenhydramine-Apap (Sleep)] Irritability         Current Outpatient Medications:   •  estradiol (CLIMARA) 0.025 MG/24HR patch, Place 1 patch on the skin 1 (One) Time Per Week., Disp: , Rfl:   •  zolpidem (AMBIEN) 10 MG tablet, TAKE 1 TABLET BY MOUTH EVERY NIGHT AT BEDTIME FOR SLEEP, Disp: 30 tablet, Rfl: 0    Aspirin use counseling: Start ASA 81 mg daily     Current medication list contains high risk medications. No harmful drug interactions have been identified.  Plan of action no changes will be implemented    Family History   Problem Relation Age of Onset   • Stroke Mother    • Deep vein thrombosis Father    • Hypertension Father    • Diabetes Maternal Grandmother    • Stomach cancer Paternal Grandmother        Social History     Tobacco Use   • Smoking status: Never Smoker   • Smokeless tobacco: Never Used   Substance Use Topics   • Alcohol use: No       Past Surgical History:   Procedure Laterality Date   • HYSTERECTOMY  1997   • TOTAL HIP ARTHROPLASTY Right 06/05/2015       Patient Active Problem List   Diagnosis   • Arthritis of hand   • Coxitis   • Muscle strain of chest wall   • Chronic depression   • Fatigue   • Insomnia   • Obstructive sleep apnea syndrome   • Antinuclear factor positive   • Restless legs syndrome   • Scapulocostal syndrome   • Increased frequency of urination   • Status post total replacement of right hip   • Encounter for monitoring primary estrogen replacement therapy   • Radiculopathy of leg   • Lateral femoral cutaneous neuropathy, right   • Left hip pain   • Primary osteoarthritis of left hip       Review of Systems    Objective     Vitals:    03/26/19 1417   BP: 128/64   Pulse: 72   Temp: 98.7 °F (37.1 °C)   TempSrc: Oral   SpO2: 95%   Weight: 77.1 kg (170 lb)   Height: 160 cm (63\")   PainSc:   4   PainLoc: " Generalized       Patient's Body mass index is 30.11 kg/m². BMI is above normal parameters. Recommendations include: exercise counseling.      No exam data present    The patient has no evidence of cognitve impairment.     Physical Exam    Recent Lab Results:  Lab Results   Component Value Date    GLU 89 05/24/2018            Assessment/Plan   Age-appropriate Screening Schedule:  Refer to the list below for future screening recommendations based on patient's age, sex and/or medical conditions.      Health Maintenance   Topic Date Due   • TDAP/TD VACCINES (1 - Tdap) 07/07/1969   • ZOSTER VACCINE (1 of 2) 07/07/2000   • PNEUMOCOCCAL VACCINES (65+ LOW/MEDIUM RISK) (1 of 2 - PCV13) 07/07/2015   • COLONOSCOPY  04/22/2016   • INFLUENZA VACCINE  08/01/2018   • MAMMOGRAM  10/01/2020       Medicare Risks and Personalized Health Plan:  Obesity/Overweight condition;  Exercise prescription included in the after visit summary (AVS)      CMS-Preventive Services Quick Reference  Medicare Preventive Services Addressed:  Annual Wellness Visit (AWV)  Hepatitis C Virus Screening (beneficiaries must fall into one of the following categories to be eligible- high risk for HCV infection, born between [birth year]19, or history of blood transfusion before 1992)  Intensive Behavioral Therapy (IBT) for Obesity (15 minutes counseling time, Code ); Medicare covers benificaries if BMI is > or = 30, beneficiary is competent and alert at the time of counseling, and counseling is furnished by primary care provider in a primary care setting    Advance Care Planning:  Patient does not have an advance directive - additional information requested. Referral to advance care planning placed    There are no diagnoses linked to this encounter.    An After Visit Summary and PPPS with all of these plans were given to the patient.      Follow Up:  No Follow-up on file.

## 2019-03-30 LAB
CHOLEST SERPL-MCNC: 196 MG/DL (ref 100–199)
DRUGS UR: NORMAL
HCV AB S/CO SERPL IA: <0.1 S/CO RATIO (ref 0–0.9)
HDLC SERPL-MCNC: 56 MG/DL
LDLC SERPL CALC-MCNC: 120 MG/DL (ref 0–99)
TRIGL SERPL-MCNC: 102 MG/DL (ref 0–149)
VLDLC SERPL CALC-MCNC: 20 MG/DL (ref 5–40)

## 2019-03-31 NOTE — PROGRESS NOTES
Please call the patient regarding her result. Hepatitis C screening negative, mild LDL cholesterol elevation. Work on diet quality. Follow up 6 months.

## 2019-10-21 RX ORDER — ZOLPIDEM TARTRATE 10 MG/1
TABLET ORAL
Qty: 30 TABLET | Refills: 0 | OUTPATIENT
Start: 2019-10-21

## 2019-11-19 ENCOUNTER — APPOINTMENT (OUTPATIENT)
Dept: WOMENS IMAGING | Facility: HOSPITAL | Age: 69
End: 2019-11-19

## 2019-11-19 PROCEDURE — 77067 SCR MAMMO BI INCL CAD: CPT | Performed by: RADIOLOGY

## 2019-11-19 PROCEDURE — 77063 BREAST TOMOSYNTHESIS BI: CPT | Performed by: RADIOLOGY

## 2019-12-20 RX ORDER — ZOLPIDEM TARTRATE 10 MG/1
TABLET ORAL
Qty: 30 TABLET | OUTPATIENT
Start: 2019-12-20

## 2019-12-31 RX ORDER — ZOLPIDEM TARTRATE 10 MG/1
TABLET ORAL
Qty: 30 TABLET | OUTPATIENT
Start: 2019-12-31

## 2020-01-08 ENCOUNTER — OFFICE VISIT (OUTPATIENT)
Dept: FAMILY MEDICINE CLINIC | Facility: CLINIC | Age: 70
End: 2020-01-08

## 2020-01-08 VITALS
SYSTOLIC BLOOD PRESSURE: 128 MMHG | WEIGHT: 171 LBS | HEART RATE: 64 BPM | OXYGEN SATURATION: 98 % | BODY MASS INDEX: 30.3 KG/M2 | TEMPERATURE: 98.5 F | HEIGHT: 63 IN | DIASTOLIC BLOOD PRESSURE: 64 MMHG

## 2020-01-08 DIAGNOSIS — M25.571 CHRONIC PAIN OF BOTH ANKLES: ICD-10-CM

## 2020-01-08 DIAGNOSIS — G89.29 CHRONIC PAIN OF BOTH ANKLES: ICD-10-CM

## 2020-01-08 DIAGNOSIS — M89.8X6 BONE PAIN OF LOWER LEG: ICD-10-CM

## 2020-01-08 DIAGNOSIS — M25.572 CHRONIC PAIN OF BOTH ANKLES: ICD-10-CM

## 2020-01-08 DIAGNOSIS — Z51.81 MEDICATION MONITORING ENCOUNTER: Primary | ICD-10-CM

## 2020-01-08 DIAGNOSIS — T75.3XXA SEA SICKNESS, INITIAL ENCOUNTER: ICD-10-CM

## 2020-01-08 DIAGNOSIS — G47.00 INSOMNIA, UNSPECIFIED TYPE: ICD-10-CM

## 2020-01-08 PROBLEM — M99.09 SOMATIC DYSFUNCTION: Status: ACTIVE | Noted: 2019-08-28

## 2020-01-08 PROCEDURE — 99214 OFFICE O/P EST MOD 30 MIN: CPT | Performed by: NURSE PRACTITIONER

## 2020-01-08 RX ORDER — HYDROCODONE BITARTRATE AND ACETAMINOPHEN 5; 325 MG/1; MG/1
1 TABLET ORAL NIGHTLY PRN
Qty: 30 TABLET | Refills: 0 | Status: SHIPPED | OUTPATIENT
Start: 2020-01-08 | End: 2020-11-01

## 2020-01-08 RX ORDER — ONDANSETRON 4 MG/1
4 TABLET, ORALLY DISINTEGRATING ORAL EVERY 8 HOURS PRN
Qty: 21 TABLET | Refills: 0 | Status: SHIPPED | OUTPATIENT
Start: 2020-01-08 | End: 2020-06-26

## 2020-01-08 RX ORDER — SCOLOPAMINE TRANSDERMAL SYSTEM 1 MG/1
1 PATCH, EXTENDED RELEASE TRANSDERMAL
Qty: 4 EACH | Refills: 0 | Status: SHIPPED | OUTPATIENT
Start: 2020-01-08 | End: 2020-07-24

## 2020-01-08 RX ORDER — ZOLPIDEM TARTRATE 10 MG/1
10 TABLET ORAL NIGHTLY PRN
Qty: 30 TABLET | Refills: 3 | Status: SHIPPED | OUTPATIENT
Start: 2020-01-08 | End: 2020-07-07 | Stop reason: SDUPTHER

## 2020-01-08 NOTE — PROGRESS NOTES
"Subjective   Malissa Silva is a 69 y.o. female who presents for a follow up on insomnia. Also needs rx for sea sickness.     History of Present Illness   Takes 1/2 of ambien has been out for 3 nights. Has been taking benadryl. Staying in good health. No colds or flu. No headaches.  Going on cruise, never been on 1 before, gets terribly seasick on small boat. Would like to have treatment available for nausea and seasickness.  Arthritis--trying CBD oil, but made a little depressed, so quit. Taking a new vitamin for arthritis, fast action HP. This helps for hip, but has sporadic overwhelming pain in lower legs and ankles, tries to fight. This ongoing >1 yr. Took 1/2 leftover painpill(HC). Overwhelming in LE pain in ankles primarily. Sister recently Dx with bone marrow cancer. Drinking water, vit C, green smoothie to address. Pain occurs in the evenings, but not daily, can go 2 weeks without at times. Has been hoarding left over pain pills from hip surgery to address as OTC does not do the trick  Sees gyn regular--taking estrogen still. Gets hot flashes if forgets to change estrogen patches.   No energy, not to gym for a week.  The following portions of the patient's history were reviewed and updated as appropriate: allergies, current medications, past family history, past medical history, past social history, past surgical history and problem list.    Review of Systems   Constitutional: Positive for activity change and fatigue. Negative for appetite change, chills, fever, unexpected weight gain and unexpected weight loss.   HENT: Negative.    Respiratory: Negative.  Negative for shortness of breath.    Cardiovascular: Negative.  Negative for chest pain, palpitations and leg swelling.   Musculoskeletal: Positive for arthralgias.        Bone pain   Psychiatric/Behavioral: Negative.      /64   Pulse 64   Temp 98.5 °F (36.9 °C) (Oral)   Ht 160 cm (63\")   Wt 77.6 kg (171 lb)   SpO2 98%   BMI 30.29 kg/m² "     Objective   Physical Exam   Constitutional: She is oriented to person, place, and time. She appears well-developed and well-nourished. No distress.   Neck: Normal range of motion. Neck supple. No thyromegaly present.   Cardiovascular: Normal rate, regular rhythm and normal heart sounds.   Pulmonary/Chest: Effort normal and breath sounds normal.   Musculoskeletal: She exhibits no edema or tenderness.        Right ankle: Normal.        Left ankle: Normal.        Right lower leg: Normal. She exhibits no tenderness, no bony tenderness, no swelling, no edema and no deformity.        Left lower leg: Normal. She exhibits no tenderness, no bony tenderness, no swelling, no edema and no deformity.   Lymphadenopathy:     She has no cervical adenopathy.   Neurological: She is alert and oriented to person, place, and time.   Skin: Skin is warm and dry. Capillary refill takes less than 2 seconds. No rash noted.   Psychiatric: She has a normal mood and affect. Her speech is normal and behavior is normal. Judgment and thought content normal. Cognition and memory are normal.   Nursing note and vitals reviewed.    Assessment/Plan   Problems Addressed this Visit        Other    Insomnia    Relevant Medications    zolpidem (AMBIEN) 10 MG tablet    Medication monitoring encounter - Primary    Relevant Orders    ToxASSURE Select 13 (MW) - Urine, Clean Catch      Other Visit Diagnoses     Chronic pain of both ankles        Relevant Medications    HYDROcodone-acetaminophen (NORCO) 5-325 MG per tablet    Other Relevant Orders    CBC & Differential    Comprehensive Metabolic Panel    Peripheral Blood Smear    Urinalysis With Microscopic If Indicated (No Culture) - Urine, Clean Catch    Protein Electrophoresis, Total    Alkaline Phosphatase, Bone Specific    Sea sickness, initial encounter        Relevant Medications    ondansetron ODT (ZOFRAN-ODT) 4 MG disintegrating tablet    Bone pain of lower leg        Relevant Orders    CBC &  Differential    Comprehensive Metabolic Panel    Peripheral Blood Smear    Urinalysis With Microscopic If Indicated (No Culture) - Urine, Clean Catch    Protein Electrophoresis, Total    Alkaline Phosphatase, Bone Specific        Insomnia--discussed increased risks of ambien in the elderly, risks of falls, aberrant behavior, dementia long term. Aware of risks and desires to continue. Takes half dose nightly  Chronic pain of both ankles--unable to elicit on exam, with sister's recent diagnosis, recommend labs and potentially imaging. Reasonable for low dose HC for now. Risks and benefits discussed.  Sea sickness management with scopolamine patches and prn zofran discussed.  FU minimally every 6 months    STEFANIE Report:      As part of this patient's treatment plan, I am prescribing controlled substances. The patient has been made aware of appropriate use of such medications, including potential risk of somnolence, limited ability to drive and /or work safely, and potential for dependence or overdose. It has also been made clear that these medications are for use by this patient only, without concomitant use of alcohol or other substances unless prescribed.    Patient has completed prescribing agreement detailing terms of continued prescribing of controlled substances, including monitoring STEFANIE reports, urine drug screening, and pill counts if necessary. The patient is aware that inappropriate use will result in cessation of prescribing such medications.    STEFANIE report has been reviewed by: ALLISON Marte on 01/13/20.  The report was scanned into the patient's chart.    Date of last STEFANIE:  1/8/2020    History and physical exam exhibit continued safe and appropriate use of controlled substances.

## 2020-01-09 ENCOUNTER — TELEPHONE (OUTPATIENT)
Dept: FAMILY MEDICINE CLINIC | Facility: CLINIC | Age: 70
End: 2020-01-09

## 2020-01-09 NOTE — TELEPHONE ENCOUNTER
----- Message from ALLISON Hogue sent at 1/9/2020  8:24 AM EST -----  In reflection, with your sister's history and your elevated white count last year, we should check some labs regarding your bone pain, consider xrays if not settling. Will place orders.

## 2020-01-14 LAB — DRUGS UR: NORMAL

## 2020-01-17 ENCOUNTER — TELEPHONE (OUTPATIENT)
Dept: FAMILY MEDICINE CLINIC | Facility: CLINIC | Age: 70
End: 2020-01-17

## 2020-01-21 LAB
ALBUMIN SERPL ELPH-MCNC: 3.7 G/DL (ref 2.9–4.4)
ALBUMIN SERPL-MCNC: 4.4 G/DL (ref 3.6–4.8)
ALBUMIN/GLOB SERPL: 1.1 {RATIO} (ref 0.7–1.7)
ALBUMIN/GLOB SERPL: 1.6 {RATIO} (ref 1.2–2.2)
ALP BONE SERPL-MCNC: 11.2 UG/L
ALP SERPL-CCNC: 69 IU/L (ref 39–117)
ALPHA1 GLOB SERPL ELPH-MCNC: 0.3 G/DL (ref 0–0.4)
ALPHA2 GLOB SERPL ELPH-MCNC: 0.8 G/DL (ref 0.4–1)
ALT SERPL-CCNC: 34 IU/L (ref 0–32)
APPEARANCE UR: CLEAR
AST SERPL-CCNC: 35 IU/L (ref 0–40)
B-GLOBULIN SERPL ELPH-MCNC: 1.3 G/DL (ref 0.7–1.3)
BASOPHILS # BLD AUTO: 0 X10E3/UL (ref 0–0.2)
BASOPHILS NFR BLD AUTO: 0 %
BILIRUB SERPL-MCNC: 0.4 MG/DL (ref 0–1.2)
BILIRUB UR QL STRIP: NEGATIVE
BUN SERPL-MCNC: 13 MG/DL (ref 8–27)
BUN/CREAT SERPL: 18 (ref 12–28)
CALCIUM SERPL-MCNC: 9.7 MG/DL (ref 8.7–10.3)
CHLORIDE SERPL-SCNC: 100 MMOL/L (ref 96–106)
CO2 SERPL-SCNC: 23 MMOL/L (ref 20–29)
COLOR UR: YELLOW
CREAT SERPL-MCNC: 0.72 MG/DL (ref 0.57–1)
EOSINOPHIL # BLD AUTO: 0.1 X10E3/UL (ref 0–0.4)
EOSINOPHIL NFR BLD AUTO: 2 %
ERYTHROCYTE [DISTWIDTH] IN BLOOD BY AUTOMATED COUNT: 15.1 % (ref 11.7–15.4)
GAMMA GLOB SERPL ELPH-MCNC: 1 G/DL (ref 0.4–1.8)
GLOBULIN SER CALC-MCNC: 2.7 G/DL (ref 1.5–4.5)
GLOBULIN SER CALC-MCNC: 3.4 G/DL (ref 2.2–3.9)
GLUCOSE SERPL-MCNC: 101 MG/DL (ref 65–99)
GLUCOSE UR QL: NEGATIVE
HCT VFR BLD AUTO: 38.3 % (ref 34–46.6)
HGB BLD-MCNC: 12.5 G/DL (ref 11.1–15.9)
HGB UR QL STRIP: NEGATIVE
IMM GRANULOCYTES # BLD AUTO: 0 X10E3/UL (ref 0–0.1)
IMM GRANULOCYTES NFR BLD AUTO: 0 %
KETONES UR QL STRIP: NEGATIVE
LABORATORY COMMENT REPORT: NORMAL
LEUKOCYTE ESTERASE UR QL STRIP: NEGATIVE
LYMPHOCYTES # BLD AUTO: 1.9 X10E3/UL (ref 0.7–3.1)
LYMPHOCYTES NFR BLD AUTO: 32 %
M PROTEIN SERPL ELPH-MCNC: NORMAL G/DL
MCH RBC QN AUTO: 27.4 PG (ref 26.6–33)
MCHC RBC AUTO-ENTMCNC: 32.6 G/DL (ref 31.5–35.7)
MCV RBC AUTO: 84 FL (ref 79–97)
MICRO URNS: NORMAL
MONOCYTES # BLD AUTO: 0.4 X10E3/UL (ref 0.1–0.9)
MONOCYTES NFR BLD AUTO: 6 %
NEUTROPHILS # BLD AUTO: 3.7 X10E3/UL (ref 1.4–7)
NEUTROPHILS NFR BLD AUTO: 60 %
NITRITE UR QL STRIP: NEGATIVE
PATH REV BLD -IMP: NORMAL
PATHOLOGIST NAME: NORMAL
PH UR STRIP: 6.5 [PH] (ref 5–7.5)
PLATELET # BLD AUTO: 323 X10E3/UL (ref 150–450)
POTASSIUM SERPL-SCNC: 4.5 MMOL/L (ref 3.5–5.2)
PROT SERPL-MCNC: 7.1 G/DL (ref 6–8.5)
PROT UR QL STRIP: NEGATIVE
RBC # BLD AUTO: 4.56 X10E6/UL (ref 3.77–5.28)
SODIUM SERPL-SCNC: 137 MMOL/L (ref 134–144)
SP GR UR: 1 (ref 1–1.03)
UROBILINOGEN UR STRIP-MCNC: 0.2 MG/DL (ref 0.2–1)
WBC # BLD AUTO: 6.1 X10E3/UL (ref 3.4–10.8)

## 2020-06-05 ENCOUNTER — PATIENT OUTREACH (OUTPATIENT)
Dept: CASE MANAGEMENT | Facility: OTHER | Age: 70
End: 2020-06-05

## 2020-06-05 NOTE — OUTREACH NOTE
LM for pt to call and schedule AWV.     Kavon Vickers, [facility]  Health and    Phone: [phone]

## 2020-06-08 ENCOUNTER — PATIENT OUTREACH (OUTPATIENT)
Dept: CASE MANAGEMENT | Facility: OTHER | Age: 70
End: 2020-06-08

## 2020-06-08 NOTE — OUTREACH NOTE
Spoke with patient to schedule AWV. Pt was scheduled for 8/4/2020 at 2:15 pm. Pt had questions about her Ambien 10 mg tablet. She stated her pharmacy was requesting a PA on this medication. They told her they would send one over. Pt was requesting an update on prior auth.    Kavon Vickers, [facility]  Health and    Phone: [phone]

## 2020-07-07 DIAGNOSIS — G47.00 INSOMNIA, UNSPECIFIED TYPE: ICD-10-CM

## 2020-07-07 RX ORDER — ZOLPIDEM TARTRATE 10 MG/1
10 TABLET ORAL NIGHTLY PRN
Qty: 30 TABLET | Refills: 0 | Status: SHIPPED | OUTPATIENT
Start: 2020-07-07 | End: 2020-07-24

## 2020-07-07 NOTE — TELEPHONE ENCOUNTER
Patient took the rx home before noticing the pills were not white. She tried to take it back and [facility] would not allow it. She took the yellow pills for 3 nights and developed a headache. The yellow pills also do not help her sleep as well. She states she has been on the white pills for years and cannot go without. She found a pharmacy that has them in stock and is asking for a new script to be sent in. She understands she will have to pay out of pocket. Please advise.     [facility] in [place], [phone]

## 2020-07-15 ENCOUNTER — TELEPHONE (OUTPATIENT)
Dept: FAMILY MEDICINE CLINIC | Facility: CLINIC | Age: 70
End: 2020-07-15

## 2020-07-17 RX ORDER — LEVOFLOXACIN 500 MG/1
500 TABLET, FILM COATED ORAL DAILY
Qty: 7 TABLET | Refills: 0 | Status: SHIPPED | OUTPATIENT
Start: 2020-07-17 | End: 2020-07-24

## 2020-07-24 ENCOUNTER — OFFICE VISIT (OUTPATIENT)
Dept: FAMILY MEDICINE CLINIC | Facility: CLINIC | Age: 70
End: 2020-07-24

## 2020-07-24 VITALS
WEIGHT: 169 LBS | SYSTOLIC BLOOD PRESSURE: 116 MMHG | DIASTOLIC BLOOD PRESSURE: 72 MMHG | BODY MASS INDEX: 29.95 KG/M2 | HEART RATE: 73 BPM | TEMPERATURE: 98.4 F | OXYGEN SATURATION: 98 % | HEIGHT: 63 IN

## 2020-07-24 DIAGNOSIS — G47.00 INSOMNIA, UNSPECIFIED TYPE: ICD-10-CM

## 2020-07-24 DIAGNOSIS — R53.82 CHRONIC FATIGUE: ICD-10-CM

## 2020-07-24 DIAGNOSIS — G47.33 OBSTRUCTIVE SLEEP APNEA SYNDROME: Primary | ICD-10-CM

## 2020-07-24 PROCEDURE — 99213 OFFICE O/P EST LOW 20 MIN: CPT | Performed by: NURSE PRACTITIONER

## 2020-07-24 RX ORDER — ZOLPIDEM TARTRATE 6.25 MG/1
6.25 TABLET, FILM COATED, EXTENDED RELEASE ORAL NIGHTLY PRN
Qty: 30 TABLET | Refills: 0 | Status: SHIPPED | OUTPATIENT
Start: 2020-07-24 | End: 2020-09-02

## 2020-07-28 ENCOUNTER — TELEPHONE (OUTPATIENT)
Dept: FAMILY MEDICINE CLINIC | Facility: CLINIC | Age: 70
End: 2020-07-28

## 2020-07-30 DIAGNOSIS — G47.00 INSOMNIA, UNSPECIFIED TYPE: ICD-10-CM

## 2020-07-30 RX ORDER — ZOLPIDEM TARTRATE 10 MG/1
TABLET ORAL
Qty: 30 TABLET | Refills: 0 | OUTPATIENT
Start: 2020-07-30

## 2020-07-30 NOTE — TELEPHONE ENCOUNTER
It wasn't working, so would not recommend. She can check with her pharmacist and see if doxepin 10 mg is available in a form she can take

## 2020-08-21 NOTE — TELEPHONE ENCOUNTER
Patient says she has called her pharmacy and the doxepin also contains red dye. She is having trouble sleeping and wants something else called in. She said even if she goes back on the medication she was originally on before it would be better than nothing. Slept 3 hours last night.

## 2020-08-28 ENCOUNTER — TELEPHONE (OUTPATIENT)
Dept: FAMILY MEDICINE CLINIC | Facility: CLINIC | Age: 70
End: 2020-08-28

## 2020-08-28 NOTE — TELEPHONE ENCOUNTER
Pt called to inquire about her sleep medication. Her appt for today was cancelled. She says belsomra was too expensive. A PA was obtained and approved. She will call the pharmacy back to check the updated price on this.

## 2020-09-02 ENCOUNTER — OFFICE VISIT (OUTPATIENT)
Dept: FAMILY MEDICINE CLINIC | Facility: CLINIC | Age: 70
End: 2020-09-02

## 2020-09-02 DIAGNOSIS — G47.00 INSOMNIA, UNSPECIFIED TYPE: Primary | ICD-10-CM

## 2020-09-02 DIAGNOSIS — S69.91XS: ICD-10-CM

## 2020-09-02 DIAGNOSIS — F32.A CHRONIC DEPRESSION: ICD-10-CM

## 2020-09-02 PROBLEM — S69.91XA INJURY TO FINGERNAIL OF RIGHT HAND: Status: ACTIVE | Noted: 2020-09-02

## 2020-09-02 PROBLEM — Z11.59 NEED FOR HEPATITIS C SCREENING TEST: Status: RESOLVED | Noted: 2019-03-26 | Resolved: 2020-09-02

## 2020-09-02 PROCEDURE — 99443 PR PHYS/QHP TELEPHONE EVALUATION 21-30 MIN: CPT | Performed by: NURSE PRACTITIONER

## 2020-09-02 NOTE — PROGRESS NOTES
Subjective   Malissa Silva is a 70 y.o. female. Insomnia     History of Present Illness   Had been on ambien 5 for years, but was not maintaining sleep and was needing a second dose during the night to maintain. Mom passed away a few weeks ago. Her services were on Monday. Last night finally got some sleep. Mom was matriarch of family. Sleep med was $173 belsomra is working at 15 mg nightly. Does not want to pay for this. Would like to go back to ambien.  Has a fingernail that is reddish purple. google told severe liver disease. Does not recall discussing ambien safety profile previously.   The following portions of the patient's history were reviewed and updated as appropriate: allergies, current medications, past family history, past medical history, past social history, past surgical history and problem list.    Review of Systems   Constitutional: Positive for activity change, diaphoresis and fatigue. Negative for chills and fever.   Neurological: Positive for headache.   Psychiatric/Behavioral: Positive for behavioral problems, decreased concentration, sleep disturbance, depressed mood and stress. Negative for dysphoric mood, hallucinations, self-injury, suicidal ideas and negative for hyperactivity.       Objective   Physical Exam   Constitutional: She is oriented to person, place, and time. She appears well-developed and well-nourished. No distress.   Pulmonary/Chest: Effort normal.   Neurological: She is alert and oriented to person, place, and time.   Skin: Skin is dry. She is not diaphoretic.   Psychiatric: Her behavior is normal. Thought content normal. Her mood appears anxious. She expresses inappropriate judgment. She exhibits a depressed mood.     Assessment/Plan   Problems Addressed this Visit        Musculoskeletal and Integument    Injury to fingernail of right hand       Other    Chronic depression    Insomnia - Primary            Fingernail color change--Brother has history of Hep C, but her  screening is negative. Liver screening labs normal this year. If any increase in nail symptoms, recommend FU in office  Chronic depression--clearly a component of her sleep issues, compounded by grief  Insomnia--slept well last night, but does not like cost. Has multiple allergies limiting options, but also has been fixated on restarting ambien--which by documentation was really not helping sleep latency or efficacy. May need ultimately to see sleep psychology. Have discussed multiple times the increased risks of ambien, especially in the elderly. Would not restart. Await input of sleep evaluation in 2 weeks as never really completed sleep workup. Will discuss further strategies after this evaluation.    You have chosen to receive care through a telephone visit. Do you consent to use a telephone visit for your medical care today? Yes  21 min EM--extensive counseling about dangers and risks of ambien and the way it was affecting her and dangerous misuse, risk of aberrant behaviors--which she was exhibiting

## 2020-09-15 ENCOUNTER — OFFICE VISIT (OUTPATIENT)
Dept: NEUROLOGY | Facility: CLINIC | Age: 70
End: 2020-09-15

## 2020-09-15 VITALS
HEIGHT: 63 IN | HEART RATE: 65 BPM | WEIGHT: 168 LBS | DIASTOLIC BLOOD PRESSURE: 68 MMHG | BODY MASS INDEX: 29.77 KG/M2 | OXYGEN SATURATION: 94 % | SYSTOLIC BLOOD PRESSURE: 114 MMHG

## 2020-09-15 DIAGNOSIS — G25.81 RESTLESS LEGS SYNDROME (RLS): ICD-10-CM

## 2020-09-15 DIAGNOSIS — E83.10 DISORDER OF IRON METABOLISM, UNSPECIFIED: ICD-10-CM

## 2020-09-15 DIAGNOSIS — G47.09 OTHER INSOMNIA: Primary | ICD-10-CM

## 2020-09-15 PROCEDURE — 99205 OFFICE O/P NEW HI 60 MIN: CPT | Performed by: PSYCHIATRY & NEUROLOGY

## 2020-09-15 RX ORDER — ESTRADIOL 0.05 MG/D
PATCH TRANSDERMAL
COMMUNITY
Start: 2020-09-07 | End: 2021-01-05 | Stop reason: SDUPTHER

## 2020-09-15 NOTE — PROGRESS NOTES
Subjective:     Patient ID: Malissa Silva is a 70 y.o. female.    Ms. Silva is a 70-year-old female with a history of insomnia who presents to the neurology clinic today as a new patient for the evaluation of insomnia. Has sleep onset and maintenance insomnia.  Has been an issue since 2004.  That is when her mother became ill.  She passes away 2 weeks ago.  Now she is sad and can't sleep.  Sleep was normal before then.  At that time, went to bed at 10:30 and woke up at 6.  Then it went to midnight to 8.  Now, bedtime is midnight to 1 am.  Is sleepy.  Takes 1-2 hours to fall asleep.  Gets up every 2 hours.  Gets up about 4 times.  Goes to the bathroom each time and is thirsty and drinks water.  Not in pain, but will take Advil and it relaxes her.  Gets out of bed around 1-2 pm.  Ambien was stopped earlier this year.   Allergic to red dye.  Can't take certain meds.  Not sure which ones she couldn't take.  Belsomra was >$100.  It does help, but a full pill gives her a headache and feels bad.  A half doesn't help.  PCP is prescribing her sleep aides.  Ambien (1/2 tab) worked great.  Was on it for many years (6-7).  Stopped working the beginning of this year.  Was prescribed an antidepressant for sleep (trazodone).  It arabella of worked.  She took herself off of it after a year.  Retired in 2014.  Saw Dr. Cornejo in 2010 maybe.  Sleep has gotten worse recently.  Just had issue with sleep onset when she worked with Dr. Cornejo in the past, but sleep is worse now and has sleep maintenance issues.  Has a nap every other week for 2-3 hours.  Is more of a night person.  Melatonin and CBD oil didn't help.  When she can't fall asleep, she will read (tablet) or play games on her phone/computer.  Will get up and walk around the house.  Sometimes gets so upset, she will just curl up on the floor.  A couple of times, got up and did yoga.  May take benadryl.  May also take magnesium.  Sometimes has RLS.  No RBD symptoms.  Did hit  knuckle once in sleep.  Drinks 1/2 cup of coffee every other day.  Drinks one glass of ice tea a week.  No soda.  Has chocolate 1/week in her milk.  Walks for 30 minutes around 7 pm.  No EtOH.  Has dinner around 8-9 pm.  The hour before bedtime, she may sew or is on the computer.   has loud snoring.  Has tried sleeping in other rooms a couple of times and it has not helped.  Doesn't sleep better in another bedroom.  No TV.  Sometimes it is hot.  It is dark.      [place] sleepiness scale:   1. Sitting and reading?  2. Watching TV?  3. Sitting inactive in a public place?  4. As a passenger in a car for an hour without a break?  5. Lying down to rest in the afternoon when able?  6. Sitting and talking to someone?  7. Sitting quietly after lunch without EtOH?  8. In a car while stopped for a few minutes in traffic?  Total:    Apnea:  Gasping- has a few times  Witnessed pauses- no  Night sweats- no  AM HA- usually not  Snoring-  says that she does  FHx- no  Dry mouth- yes    Parasomnia:Sleep walking or eating? no    ROS:  Coughing- no  Pain- sometimes  GERD- no  Nasal congestion/stuffiness- sometimes a little  Nocturia- yes          The following portions of the patient's history were reviewed and updated as appropriate: allergies, current medications, past family history, past medical history, past social history, past surgical history and problem list.    Review of Systems   Constitutional: Positive for fatigue. Negative for activity change and appetite change.   HENT: Positive for sinus pressure. Negative for ear pain and tinnitus.    Eyes: Negative for photophobia, pain and visual disturbance.   Respiratory: Positive for cough. Negative for chest tightness and shortness of breath.    Cardiovascular: Negative for chest pain, palpitations and leg swelling.   Endocrine: Positive for polydipsia. Negative for cold intolerance and heat intolerance.   Musculoskeletal: Negative for back pain, gait problem and  neck pain.   Allergic/Immunologic: Negative for environmental allergies, food allergies and immunocompromised state.   Neurological: Negative for dizziness, tremors, seizures, syncope, facial asymmetry, speech difficulty, weakness, light-headedness, numbness and headaches.   Hematological: Negative for adenopathy. Does not bruise/bleed easily.   Psychiatric/Behavioral: Positive for sleep disturbance. Negative for agitation, behavioral problems, confusion, decreased concentration, dysphoric mood, hallucinations, self-injury and suicidal ideas. The patient is not nervous/anxious and is not hyperactive.     I reviewed the ROS documented by the MA.  All other systems negative.      Objective:    Neurologic Exam    Physical Exam   **The patient is wearing a mask**  Constitutional:  Vital signs reviewed.  No apparent distress.  Well groomed.  Eyes:  No injection, no icterus.    Respiratory:  Normal effort.  Clear to auscultation bilaterally.  Cardiovascular:  Regular rate and rhythm.  No murmurs.  No carotid bruits. Symmetric radial pulses.  Musculoskeletal: Normal station.  Gait steady.  Normal arm swing.  Patient able to walk on heels and toes.  Tandem gait intact.  Romberg negative.  Muscle tone and bulk normal in the bilateral upper and lower extremities.  Strength is 5/5 in the bilateral upper and lower extremities proximally and distally unless otherwise specified in the neurological exam.  Skin:  No rashes.  Warm, dry, and intact.  Psychiatric:  Good mood.  Normal affect.    Neurologic:  Mental status-  The patient is alert and oriented to person, place and time. Attention/concentration is within normal limits.  Speech is fluent without dysarthria.  The patient is able to name, repeat and follow complex commands without difficulty.  Immediate memory and delayed recall intact (3/3 words immediate and after 4 minutes).  Fund of knowledge normal.  Cranial nerves- Pupils equally round and reactive to light with intact  accomodation.  Visual fields intact.  Extraocular movements intact.  Facial sensation intact.   Hearing intact to finger-rub bilaterally.  SCM and trapezius are 5/5 bilaterally.    Motor-  See musculoskeletal above.  No tremor.  Reflexes- 2+ in the bilateral biceps, brachioradialis, patellar and achilles.  Toes down-going bilaterally.  Sensation- Intact to pinprick and vibration in bilateral upper and lower extremities symmetrically.  Coordination- Intact to finger tapping and heel knee shin bilaterally.   Gait- See musculoskeletal exam above.       Assessment/Plan:    Ms. Silva is a 70-year-old female who presents today for sleep evaluation.    1.  Chronic Insomnia Disorder-The patient has both sleep onset and sleep maintenance issues.  In the past when she tried cognitive behavioral therapy for insomnia she was mainly just having sleep onset issues however sleep has worsened.  She does have some risk factors for restless legs and therefore I would like to check a ferritin level.  In the future we could consider treatment with gabapentin.  We had an extensive conversation regarding sleep physiology today.  In particular, I think that the patient would benefit from decreasing her screen time before and during her sleep period.  I also recommend for her to avoid eating during the night.  Her  should be evaluated for sleep apnea as well.  I would also recommend for her to try going caffeine free and to eliminate naps.  I also recommend sleep restriction.  I would recommend for her bedtime to be around 2 AM and for her to get out of the bed at 10 AM.  We can send her information regarding sleep restriction.  I would also recommend for her to keep a 2-week sleep diary.  I think she would highly benefit from cognitive behavioral therapy from Dr. Cornejo again; however, the patient declined.  Hopefully her sleep will improve at her next follow-up visit.  At that time we should do a home sleep study to evaluate for  sleep apnea.    A total of 60 minutes of face-to-face time was spent with the patient and greater than 50% that time was spent on counseling regarding her symptoms and recommendations.       Problems Addressed this Visit        Other    Insomnia - Primary      Other Visit Diagnoses     Restless legs syndrome (RLS)        Relevant Orders    Ferritin    Disorder of iron metabolism, unspecified         Relevant Orders    Ferritin

## 2020-09-15 NOTE — PATIENT INSTRUCTIONS
• At night, only use the bed and bedroom for sleep and sex only. Do not read, watch TV, eat, or worry in bed.   • Lie down only when you are sleepy.   • If you are unable to fall asleep, get up and go to another room. Avoid stimulating activities if you do get up.   • No clocks (if you tend to look at the clock throughout the night) or TV (or other electronic screens) in the bedroom.   • Avoid screens (TV, computer, tablet, cell phone) the hour prior to bedtime and during your sleep period.   • Establish a regular bedtime routine and a regular sleep/wake schedule. Keep this schedule 7 days a week.   • Do not eat or drink close to bedtime.   • Make sure your bedroom is dark, cool, and comfortable.   • If you need background noise, use a fan or a white noise machine.   • Avoid caffeine (regular coffee, decaf coffee, tea, sodas, chocolate, energy drinks).   • Avoid alcohol and nicotine close to bedtime.   • Exercise during the day, but not within 3 hours of bedtime.   • Avoid naps.   • Check if any of your night time medications may cause sleep problems.   • If you have heart burn or indigestion at night: avoid eating close to bedtime; elevated your head of bed; avoid spicy foods, tomatoes, citrus, alcohol, caffeine, and mint at night; take your antacid at night; sleep on your left side.   • If you have nasal stuffiness or congestion: consider taking an over the counter allergy medicine such as zyrtec, claritin, or allegra at night as well as a nasal spray such as Flonase or Nasacort.   • If you gasp for air at night, stop breathing in your sleep, have night sweats, snore, unrefreshing sleep, feel tired during the day, have morning headaches or dry mouth in the morning, you may be at risk for having problems with your breathing at night, likely sleep apnea. You may want to talk to your doctor about these symptoms.   • Consider trying melatonin at night. This can be purchased over the counter without a prescription.  I  recommend doses between 0.5-3 mg.  Try GNC, [employer], Life Extension (on Amazon) or REM Fresh brands.    • Consider the Night Candescent Healing Sleep  jimmy or CBT-I  for your smart device.      Try a bedtime of 2 am and wake time of 10 am and follow sleep restriction guidelines.

## 2020-09-20 NOTE — PROGRESS NOTES
Thanks for your consult. I do not see ambien as an effective treatment, but inherited her from another provider. When misuse and sleep aberrant behavior noted, stopped and referred for sleep consult. I am open to any insights. She is really pushing me to restart, not in her best interest.

## 2020-10-16 RX ORDER — SUVOREXANT 15 MG/1
15 TABLET, FILM COATED ORAL NIGHTLY
Qty: 30 TABLET | Refills: 0 | Status: SHIPPED | OUTPATIENT
Start: 2020-10-16 | End: 2021-01-05

## 2020-10-16 NOTE — TELEPHONE ENCOUNTER
Caller: Malissa Silva    Relationship: Self    Best call back number: [phone]    Medication needed:   Requested Prescriptions     Pending Prescriptions Disp Refills   • Suvorexant (Belsomra) 15 MG tablet 30 tablet 0     Sig: Take 15 mg by mouth Every Night.       When do you need the refill by: ASAP - PT IS COMPLETELY OUT    What details did the patient provide when requesting the medication:     Does the patient have less than a 3 day supply:  [x] Yes  [] No    What is the patient's preferred pharmacy: [place], [place] -[id number] [facility] [place]  [address] [place] [address] [phone] [facility] [phone]

## 2020-10-20 ENCOUNTER — OFFICE VISIT (OUTPATIENT)
Dept: NEUROLOGY | Facility: CLINIC | Age: 70
End: 2020-10-20

## 2020-10-20 ENCOUNTER — LAB (OUTPATIENT)
Dept: LAB | Facility: HOSPITAL | Age: 70
End: 2020-10-20

## 2020-10-20 ENCOUNTER — TELEPHONE (OUTPATIENT)
Dept: NEUROLOGY | Facility: CLINIC | Age: 70
End: 2020-10-20

## 2020-10-20 VITALS
SYSTOLIC BLOOD PRESSURE: 124 MMHG | DIASTOLIC BLOOD PRESSURE: 72 MMHG | HEIGHT: 63 IN | OXYGEN SATURATION: 98 % | WEIGHT: 172 LBS | HEART RATE: 74 BPM | BODY MASS INDEX: 30.48 KG/M2

## 2020-10-20 DIAGNOSIS — G25.81 RESTLESS LEGS SYNDROME (RLS): ICD-10-CM

## 2020-10-20 DIAGNOSIS — E83.10 DISORDER OF IRON METABOLISM, UNSPECIFIED: ICD-10-CM

## 2020-10-20 DIAGNOSIS — G47.09 OTHER INSOMNIA: ICD-10-CM

## 2020-10-20 DIAGNOSIS — G47.33 OBSTRUCTIVE SLEEP APNEA: Primary | ICD-10-CM

## 2020-10-20 LAB — FERRITIN SERPL-MCNC: 64.9 NG/ML (ref 13–150)

## 2020-10-20 PROCEDURE — 82728 ASSAY OF FERRITIN: CPT

## 2020-10-20 PROCEDURE — 36415 COLL VENOUS BLD VENIPUNCTURE: CPT

## 2020-10-20 PROCEDURE — 99213 OFFICE O/P EST LOW 20 MIN: CPT | Performed by: PSYCHIATRY & NEUROLOGY

## 2020-10-20 NOTE — PROGRESS NOTES
Subjective:     Patient ID: Malissa Silva is a 70 y.o. female.    Ms. Silva is a 70-year-old female with a history of insomnia who presents today as an established patient for follow-up for insomnia.  The patient was last seen as a new patient on September 15, 2020.  For details regarding her history, please refer to that note.  She reports that her sleep is a little better.  She has been going to bed around 2 AM and feels sleepy at that time.  She gets up around 11.  It takes her about 30 minutes to fall asleep.  She is still taking half of the Belsomra and she reports that this is helpful but is costing her $173 a month.  She reports that she is continuing to nap.  She reports that she does not have any motivation is sitting around a lot.  I ordered a ferritin level due to concerns for restless legs at her last visit however that was not completed.  She reports that she has been trying to decrease her screen time.  She reports that she cannot avoid eating at night because she has to have protein to sleep.  Her  has not been evaluated for sleep apnea.  She did go 2 weeks caffeine free but it did not help.  She continues to nap.  She did complete a 2-week sleep diary that shows that she is mainly going to bad between 130 and 215.  On average it is taking her 15 minutes to 2 hours to fall asleep.  She has been waking up between 8 and noon.  The patient had already seen Dr. Cornejo for cognitive behavioral therapy for insomnia and declined repeat referral.    The following portions of the patient's history were reviewed and updated as appropriate: allergies, current medications, past family history, past medical history, past social history, past surgical history and problem list.    Review of Systems   Respiratory: Negative for cough, chest tightness and shortness of breath.    Cardiovascular: Negative for chest pain, palpitations and leg swelling.        Objective:    Neurologic Exam    Physical  Exam    Assessment/Plan:    Ms. Silva is a 70-year-old female with a history of insomnia who presents today for follow-up.    1.  Insomnia-The patient reports that her symptoms are somewhat better.  I would encourage her to keep a consistent wake time.  She seems to fall asleep better that night when she gets out of bed earlier.  I also encouraged her to stop napping.  I would like for her to get her ferritin level checked in case she is having symptoms concerning for restless legs.  I would hold off on gabapentin if she continues to use Belsomra however.  Due to concerns for sleep apnea I would like for her to go ahead and get a home sleep study.  We will see the patient back after sleep study is completed.    A total of 20 minutes of face-to-face time was spent with the patient greater than 50% time was spent on counseling regarding her symptoms and plan of care.       Problems Addressed this Visit        Other    Insomnia      Other Visit Diagnoses     Obstructive sleep apnea    -  Primary    Relevant Orders    Home Sleep Study      Diagnoses       Codes Comments    Obstructive sleep apnea    -  Primary ICD-10-CM: G47.33  ICD-9-CM: 327.23     Other insomnia     ICD-10-CM: G47.09  ICD-9-CM: 780.52

## 2020-10-20 NOTE — PATIENT INSTRUCTIONS
• At night, only use the bed and bedroom for sleep and sex only. Do not read, watch TV, eat, or worry in bed.   • Lie down only when you are sleepy.   • If you are unable to fall asleep, get up and go to another room. Avoid stimulating activities if you do get up.   • No clocks (if you tend to look at the clock throughout the night) or TV (or other electronic screens) in the bedroom.   • Avoid screens (TV, computer, tablet, cell phone) the hour prior to bedtime and during your sleep period.   • Establish a regular bedtime routine and a regular sleep/wake schedule. Keep this schedule 7 days a week.   • Do not eat or drink close to bedtime.   • Make sure your bedroom is dark, cool, and comfortable.   • If you need background noise, use a fan or a white noise machine.   • Avoid caffeine (regular coffee, decaf coffee, tea, sodas, chocolate, energy drinks).   • Avoid alcohol and nicotine close to bedtime.   • Exercise during the day, but not within 3 hours of bedtime.   • Avoid naps.   • Check if any of your night time medications may cause sleep problems.   • If you have heart burn or indigestion at night: avoid eating close to bedtime; elevated your head of bed; avoid spicy foods, tomatoes, citrus, alcohol, caffeine, and mint at night; take your antacid at night; sleep on your left side.   • If you have nasal stuffiness or congestion: consider taking an over the counter allergy medicine such as zyrtec, claritin, or allegra at night as well as a nasal spray such as Flonase or Nasacort.   • If you gasp for air at night, stop breathing in your sleep, have night sweats, snore, unrefreshing sleep, feel tired during the day, have morning headaches or dry mouth in the morning, you may be at risk for having problems with your breathing at night, likely sleep apnea. You may want to talk to your doctor about these symptoms.   • Consider trying melatonin at night. This can be purchased over the counter without a prescription.  I  recommend doses between 0.5-3 mg.  Try GNC, [employer]S, Life Extension (on Amazon) or REM Fresh brands.    • Consider the Night [place] Sleep  jmimy or CBT-I  for your smart device.

## 2020-10-30 ENCOUNTER — OFFICE VISIT (OUTPATIENT)
Dept: FAMILY MEDICINE CLINIC | Facility: CLINIC | Age: 70
End: 2020-10-30

## 2020-10-30 VITALS
BODY MASS INDEX: 30.12 KG/M2 | TEMPERATURE: 97.4 F | OXYGEN SATURATION: 99 % | HEART RATE: 59 BPM | SYSTOLIC BLOOD PRESSURE: 128 MMHG | HEIGHT: 63 IN | DIASTOLIC BLOOD PRESSURE: 78 MMHG | WEIGHT: 170 LBS

## 2020-10-30 DIAGNOSIS — R53.82 CHRONIC FATIGUE: Primary | ICD-10-CM

## 2020-10-30 DIAGNOSIS — F32.A CHRONIC DEPRESSION: ICD-10-CM

## 2020-10-30 DIAGNOSIS — Z13.220 LIPID SCREENING: ICD-10-CM

## 2020-10-30 DIAGNOSIS — M54.50 CHRONIC BILATERAL LOW BACK PAIN WITHOUT SCIATICA: ICD-10-CM

## 2020-10-30 DIAGNOSIS — G47.00 INSOMNIA, UNSPECIFIED TYPE: ICD-10-CM

## 2020-10-30 DIAGNOSIS — G47.33 OBSTRUCTIVE SLEEP APNEA SYNDROME: ICD-10-CM

## 2020-10-30 DIAGNOSIS — G89.29 CHRONIC BILATERAL LOW BACK PAIN WITHOUT SCIATICA: ICD-10-CM

## 2020-10-30 PROCEDURE — 99214 OFFICE O/P EST MOD 30 MIN: CPT | Performed by: NURSE PRACTITIONER

## 2020-10-30 RX ORDER — MELOXICAM 7.5 MG/1
7.5 TABLET ORAL DAILY
Qty: 90 TABLET | Refills: 3 | Status: SHIPPED | OUTPATIENT
Start: 2020-10-30 | End: 2021-03-26

## 2020-10-30 RX ORDER — MIRTAZAPINE 7.5 MG/1
7.5 TABLET, FILM COATED ORAL NIGHTLY
Qty: 30 TABLET | Refills: 1 | Status: SHIPPED | OUTPATIENT
Start: 2020-10-30 | End: 2021-01-15 | Stop reason: SDUPTHER

## 2020-11-06 LAB
ALBUMIN SERPL-MCNC: 4.1 G/DL (ref 3.5–5.2)
ALBUMIN/GLOB SERPL: 1.9 G/DL
ALP SERPL-CCNC: 61 U/L (ref 39–117)
ALT SERPL-CCNC: 21 U/L (ref 1–33)
AST SERPL-CCNC: 19 U/L (ref 1–32)
BASOPHILS # BLD AUTO: 0.02 10*3/MM3 (ref 0–0.2)
BASOPHILS NFR BLD AUTO: 0.3 % (ref 0–1.5)
BILIRUB SERPL-MCNC: 0.5 MG/DL (ref 0–1.2)
BUN SERPL-MCNC: 15 MG/DL (ref 8–23)
BUN/CREAT SERPL: 18.8 (ref 7–25)
CALCIUM SERPL-MCNC: 9 MG/DL (ref 8.6–10.5)
CHLORIDE SERPL-SCNC: 101 MMOL/L (ref 98–107)
CHOLEST SERPL-MCNC: 190 MG/DL (ref 0–200)
CO2 SERPL-SCNC: 28.6 MMOL/L (ref 22–29)
CREAT SERPL-MCNC: 0.8 MG/DL (ref 0.57–1)
EOSINOPHIL # BLD AUTO: 0.15 10*3/MM3 (ref 0–0.4)
EOSINOPHIL NFR BLD AUTO: 2.1 % (ref 0.3–6.2)
ERYTHROCYTE [DISTWIDTH] IN BLOOD BY AUTOMATED COUNT: 14.4 % (ref 12.3–15.4)
GLOBULIN SER CALC-MCNC: 2.2 GM/DL
GLUCOSE SERPL-MCNC: 99 MG/DL (ref 65–99)
HCT VFR BLD AUTO: 37.6 % (ref 34–46.6)
HDLC SERPL-MCNC: 53 MG/DL (ref 40–60)
HGB BLD-MCNC: 12.4 G/DL (ref 12–15.9)
IMM GRANULOCYTES # BLD AUTO: 0.01 10*3/MM3 (ref 0–0.05)
IMM GRANULOCYTES NFR BLD AUTO: 0.1 % (ref 0–0.5)
LDLC SERPL CALC-MCNC: 110 MG/DL (ref 0–100)
LYMPHOCYTES # BLD AUTO: 2.37 10*3/MM3 (ref 0.7–3.1)
LYMPHOCYTES NFR BLD AUTO: 32.6 % (ref 19.6–45.3)
MCH RBC QN AUTO: 27.9 PG (ref 26.6–33)
MCHC RBC AUTO-ENTMCNC: 33 G/DL (ref 31.5–35.7)
MCV RBC AUTO: 84.7 FL (ref 79–97)
MONOCYTES # BLD AUTO: 0.48 10*3/MM3 (ref 0.1–0.9)
MONOCYTES NFR BLD AUTO: 6.6 % (ref 5–12)
NEUTROPHILS # BLD AUTO: 4.24 10*3/MM3 (ref 1.7–7)
NEUTROPHILS NFR BLD AUTO: 58.3 % (ref 42.7–76)
NRBC BLD AUTO-RTO: 0 /100 WBC (ref 0–0.2)
PLATELET # BLD AUTO: 273 10*3/MM3 (ref 140–450)
POTASSIUM SERPL-SCNC: 4.5 MMOL/L (ref 3.5–5.2)
PROT SERPL-MCNC: 6.3 G/DL (ref 6–8.5)
RBC # BLD AUTO: 4.44 10*6/MM3 (ref 3.77–5.28)
SODIUM SERPL-SCNC: 137 MMOL/L (ref 136–145)
TRIGL SERPL-MCNC: 155 MG/DL (ref 0–150)
TSH SERPL DL<=0.005 MIU/L-ACNC: 2.27 UIU/ML (ref 0.27–4.2)
VLDLC SERPL CALC-MCNC: 27 MG/DL (ref 5–40)
WBC # BLD AUTO: 7.27 10*3/MM3 (ref 3.4–10.8)

## 2020-11-08 NOTE — PROGRESS NOTES
Please call the patient regarding her abnormal result. LDL with mild elevation. Otherwise normal labs. Work on diet quality and weight. FU 6 months

## 2020-11-17 ENCOUNTER — HOSPITAL ENCOUNTER (OUTPATIENT)
Dept: SLEEP MEDICINE | Facility: HOSPITAL | Age: 70
Discharge: HOME | End: 2020-11-17
Admitting: PSYCHIATRY & NEUROLOGY

## 2020-11-17 DIAGNOSIS — G47.33 OBSTRUCTIVE SLEEP APNEA: ICD-10-CM

## 2020-11-17 PROCEDURE — 95806 SLEEP STUDY UNATT&RESP EFFT: CPT

## 2020-12-07 ENCOUNTER — APPOINTMENT (OUTPATIENT)
Dept: WOMENS IMAGING | Facility: HOSPITAL | Age: 70
End: 2020-12-07

## 2020-12-07 PROCEDURE — 77067 SCR MAMMO BI INCL CAD: CPT | Performed by: RADIOLOGY

## 2020-12-07 PROCEDURE — 77063 BREAST TOMOSYNTHESIS BI: CPT | Performed by: RADIOLOGY

## 2020-12-08 ENCOUNTER — TELEPHONE (OUTPATIENT)
Dept: NEUROLOGY | Facility: CLINIC | Age: 70
End: 2020-12-08

## 2020-12-08 NOTE — TELEPHONE ENCOUNTER
PATIENT CALLED WANTING TO KNOW IF SOMEONE COULD GO OVER SLEEP STUDY RESULTS. PATIENT WAS ADVISED OF UPCOMING APPT WITH DR. PALMER FOR FOLLOW UP ON SLEEP STUDY ALSO.    PLEASE ADIVSE.    PH: [phone]

## 2020-12-09 NOTE — TELEPHONE ENCOUNTER
Attempted to call pt to let her know that the studies can take a few weeks for Dr. Mckeon to read and that hers is not completed yet. No answer. Left detailed msg.

## 2020-12-12 PROCEDURE — 95806 SLEEP STUDY UNATT&RESP EFFT: CPT | Performed by: PSYCHIATRY & NEUROLOGY

## 2021-01-05 ENCOUNTER — OFFICE VISIT (OUTPATIENT)
Dept: NEUROLOGY | Facility: CLINIC | Age: 71
End: 2021-01-05

## 2021-01-05 VITALS
SYSTOLIC BLOOD PRESSURE: 159 MMHG | DIASTOLIC BLOOD PRESSURE: 80 MMHG | HEIGHT: 63 IN | HEART RATE: 96 BPM | BODY MASS INDEX: 30.48 KG/M2 | OXYGEN SATURATION: 94 % | WEIGHT: 172 LBS

## 2021-01-05 DIAGNOSIS — G47.33 OBSTRUCTIVE SLEEP APNEA: Primary | ICD-10-CM

## 2021-01-05 DIAGNOSIS — E66.9 OBESITY (BMI 30.0-34.9): ICD-10-CM

## 2021-01-05 DIAGNOSIS — G47.09 OTHER INSOMNIA: ICD-10-CM

## 2021-01-05 PROCEDURE — 99213 OFFICE O/P EST LOW 20 MIN: CPT | Performed by: PSYCHIATRY & NEUROLOGY

## 2021-01-05 RX ORDER — ZOLPIDEM TARTRATE 10 MG/1
10 TABLET ORAL NIGHTLY PRN
COMMUNITY
End: 2021-01-26

## 2021-01-05 RX ORDER — ESTRADIOL 0.04 MG/D
1 PATCH TRANSDERMAL WEEKLY
COMMUNITY
Start: 2020-12-16

## 2021-01-05 NOTE — PROGRESS NOTES
Subjective:     Patient ID: Malissa Silva is a 70 y.o. female.    Ms. Silva is a 70-year-old female with a history of insomnia who presents today as an established patient for follow-up for her sleep study.  The patient was a new patient back in September and was last seen October 20, 2020.  Due to concerns for sleep apnea we did a home sleep study on November 17.  It showed mild sleep apnea with an AHI of 10.3.  The patient reports that in the past she had had an in lab sleep study that also showed some apnea.  She had tried CPAP about 15 years ago but did not tolerate it.  She also went to a dentist and got an oral appliance.  She reports that wearing the oral appliance that not improve her insomnia.  She never had a sleep study after it was made and reported that she had some dental work done and then it no longer fits.  She comes to the appointment today with a [employer] coffee.  We checked a ferritin level back in October and it was greater than 50.    The following portions of the patient's history were reviewed and updated as appropriate: allergies, current medications, past family history, past medical history, past social history, past surgical history and problem list.    Review of Systems   Respiratory: Negative for cough, chest tightness and shortness of breath.    Cardiovascular: Negative for chest pain, palpitations and leg swelling.        Objective:    Neurologic Exam    Physical Exam    Assessment/Plan:    Ms. Silva is a 70-year-old female with a history of insomnia who presents today for follow-up.    1.  Mild obstructive sleep apnea-we discussed the diagnosis of sleep apnea as well as the consequences of untreated sleep apnea.  I recommend treatment.  I recommend avoiding hydrocodone around bedtime.  We also discussed weight loss.  The patient is interested in nutrition referral.  I also recommend treatment.  The patient does not feel like she would tolerate CPAP and would like a referral to  see a dentist for another oral appliance to be made.  When she has the device made we can then repeat a sleep study to make sure that it is adequately treating her apnea.    A total of 20 minutes of face-to-face time was spent with the patient greater than 50% that time was spent on counseling regarding her test results and plan of care.       Problems Addressed this Visit        Other    Insomnia      Other Visit Diagnoses     Obstructive sleep apnea    -  Primary    Obesity (BMI 30.0-34.9)        Relevant Orders    Ambulatory Referral to Nutrition Services      Diagnoses       Codes Comments    Obstructive sleep apnea    -  Primary ICD-10-CM: G47.33  ICD-9-CM: 327.23     Obesity (BMI 30.0-34.9)     ICD-10-CM: E66.9  ICD-9-CM: 278.00     Other insomnia     ICD-10-CM: G47.09  ICD-9-CM: 780.52

## 2021-01-11 ENCOUNTER — TELEPHONE (OUTPATIENT)
Dept: NEUROLOGY | Facility: CLINIC | Age: 71
End: 2021-01-11

## 2021-01-15 RX ORDER — MIRTAZAPINE 7.5 MG/1
7.5 TABLET, FILM COATED ORAL NIGHTLY
Qty: 30 TABLET | Refills: 1 | Status: SHIPPED | OUTPATIENT
Start: 2021-01-15 | End: 2021-01-25

## 2021-01-25 ENCOUNTER — OFFICE VISIT (OUTPATIENT)
Dept: SURGERY | Facility: CLINIC | Age: 71
End: 2021-01-25

## 2021-01-25 ENCOUNTER — PREP FOR SURGERY (OUTPATIENT)
Dept: OTHER | Facility: HOSPITAL | Age: 71
End: 2021-01-25

## 2021-01-25 VITALS — WEIGHT: 175.4 LBS | BODY MASS INDEX: 31.08 KG/M2 | HEIGHT: 63 IN

## 2021-01-25 DIAGNOSIS — R19.5 POSITIVE COLORECTAL CANCER SCREENING USING DNA-BASED STOOL TEST: Primary | ICD-10-CM

## 2021-01-25 PROCEDURE — 99204 OFFICE O/P NEW MOD 45 MIN: CPT | Performed by: SURGERY

## 2021-01-25 NOTE — PROGRESS NOTES
"SURGERY  Malissa Silva   1950 01/25/21    Chief Complaint:  Positive cologuard    HPI    Ms. Silva is a  70 y.o. female who presents with a positive Cologuard, having had a negative one in 2017.  She is very anxious and has a multitude of questions which she tells me right off the bat.  We completed all of those to her satisfaction.    In short, she had no problems until she had the diagnosis of the positive Cologuard, and the next day began Banthine blood upon wiping and an occasional smear on her pad which has been going on for about 2 weeks.  She suspects it is internal hemorrhoids having been told she had them previously.  She denies any straining or hard stool but does seem to have some pain after a bowel movement.  She does take meloxicam daily for generalized arthritis and thus may have a predilection for bleeding based on this.  She has no family history of colon cancer.    We spent most of our visit talking about her fragile state, saying that she passes out with epinephrine at the dentist, is \"extremely out of it\" after any sort of anesthetic, guarantees that she will need oxygen, will have a prolonged postoperative stay, and numerous allergies 1 of which is red dye.  She is very concerned about the ink of the tattoo.  She confides that she does not use a sleep apnea machine because she does not think she would tolerate it.  She also notes that she is tearful daily, having a difficult time getting over her mother's passage within the last year.  She tried Remeron without improvement and thus stopped that.  She does confide that she is an anxious person.    Past Medical History:   Diagnosis Date   • Anesthesia complication    • Bursitis    • Depression     approx 2008   • Insomnia    • Mild sleep apnea    • Osteoarthritis      Past Surgical History:   Procedure Laterality Date   • COLONOSCOPY  2011    Dr. Walsh;    • HYSTERECTOMY  1997   • TOTAL HIP ARTHROPLASTY Right 06/05/2015     Family History "   Problem Relation Age of Onset   • Stroke Mother    • Deep vein thrombosis Father    • Hypertension Father    • Diabetes Maternal Grandmother    • Stomach cancer Paternal Grandmother    • Cancer Sister      Social History     Socioeconomic History   • Marital status:      Spouse name: Not on file   • Number of children: Not on file   • Years of education: Not on file   • Highest education level: Not on file   Tobacco Use   • Smoking status: Never Smoker   • Smokeless tobacco: Never Used   Substance and Sexual Activity   • Alcohol use: Yes     Comment: rare   • Drug use: No   • Sexual activity: Defer         Current Outpatient Medications:   •  estradiol (CLIMARA) 0.0375 MG/24HR, APPLY 1 PATCH ONCE WEEKLY, Disp: , Rfl:   •  meloxicam (Mobic) 7.5 MG tablet, Take 1 tablet by mouth Daily. (Patient taking differently: Take 7.5 mg by mouth As Needed.), Disp: 90 tablet, Rfl: 3  •  zolpidem (Ambien) 10 MG tablet, Take 10 mg by mouth At Night As Needed for Sleep., Disp: , Rfl:     Allergies   Allergen Reactions   • Chlorine Shortness Of Breath   • Iodinated Diagnostic Agents Anaphylaxis     Refuses them as afraid of reaction   • Azithromycin    • Banana Other (See Comments)     Per allergy testing   • Cantaloupe (Diagnostic) Other (See Comments)     Per allergy testing   • Chocolate Other (See Comments)     Per allergy testing   • Cinnamon Other (See Comments)     Per allergy testing   • Citrullus Vulgaris Other (See Comments)     Per allergy testing   • Clindamycin    • Daucus Carota Other (See Comments)   • Epinephrine Other (See Comments)     syncope   • Penicillins Hives     Allergic to any meds w/ red, pink, lavender, orange dye.   • Pineapple Other (See Comments)     Per allergy testing   • Red Dye    • Soybean Oil Itching   • Tetanus Immune Globulin    • Tylenol Pm Extra [Diphenhydramine-Apap (Sleep)] Irritability     Review of Systems  Positive for rectal bleeding nausea rectal pain, fatigue    Vitals:     "01/25/21 1421   Weight: 79.6 kg (175 lb 6.4 oz)   Height: 160 cm (63\")       PHYSICAL EXAM:    Ht 160 cm (63\")   Wt 79.6 kg (175 lb 6.4 oz)   BMI 31.07 kg/m²   Body mass index is 31.07 kg/m².    Constitutional: well developed, well nourished, appears  healthy  ENMT: Hearing intact, neck without masses  CVS: RRR, no murmur  Respiratory: CTA, normal respiratory effort   Gastrointestinal: abdomen soft, mildly tender diffusely, abdominal hernia not present  Musculoskeletal: gait normal, muscle mass normal  Neurological: awake and alert, seems to have reasonable capacity for understanding for medical decision making  Psychiatric: many questions, very anxious    Radiographic/Lab Findings: bun 15, creatinine 0.8    IMPRESSION:  · + cologuard.  We discussed how studies show that approximately 17% of individuals with a positive study are a false positive.  Clinically I think it is likely that it is higher than that, and more often than not, it is an advanced polyp not cancer  · History of anesthesia complication.  Prolonged anesthesia  · Mild sleep apnea.  Descriptive story about need for oxygen post op.  Discussed with her that every patient gets oxygen, unlike what she relates in her story about a prior place where she had it where she was told she did not need oxygen  · Situational depression over mother's death with prior history of depression.  Cries daily.  · Red dye allergy  · Generalized arthritis on meloxicam daily.  Discussed the fact that meloxicam can increase the risk of GI bleeding.  · Rectal bleeding, possibly hemorrhoidal.  · multiple allergies.    PLAN:  · Colonoscopy.  · Reassured her that she will be able to talk to the anesthesiologist before the procedure, she having another story about that.  Reassured her that oxygen will be used  · Reassured her that the anesthetic is different than what was used previously and much less than what she would have received for her other procedures and that she should " have a less significant reaction.  · Reassured her that if a polyp is present that is endoscopically resectable I will take it off that day.  · Reassured her that we will avoid her list of allergies    Hermelinda Walsh MD  4:30 PM    In order to provide a more personal and interactive patient experience as well as improve efficiency, this note was started prior to the office visit.

## 2021-01-26 RX ORDER — SUVOREXANT 15 MG/1
TABLET, FILM COATED ORAL
Qty: 30 TABLET | Refills: 0 | Status: SHIPPED | OUTPATIENT
Start: 2021-01-26 | End: 2021-03-01

## 2021-01-28 ENCOUNTER — OFFICE VISIT (OUTPATIENT)
Dept: FAMILY MEDICINE CLINIC | Facility: CLINIC | Age: 71
End: 2021-01-28

## 2021-01-28 VITALS
HEIGHT: 63 IN | SYSTOLIC BLOOD PRESSURE: 138 MMHG | WEIGHT: 177 LBS | HEART RATE: 65 BPM | TEMPERATURE: 97.5 F | BODY MASS INDEX: 31.36 KG/M2 | DIASTOLIC BLOOD PRESSURE: 76 MMHG | OXYGEN SATURATION: 99 %

## 2021-01-28 DIAGNOSIS — G47.00 INSOMNIA, UNSPECIFIED TYPE: ICD-10-CM

## 2021-01-28 DIAGNOSIS — F32.A CHRONIC DEPRESSION: ICD-10-CM

## 2021-01-28 DIAGNOSIS — M19.049 ARTHRITIS OF HAND: ICD-10-CM

## 2021-01-28 DIAGNOSIS — R19.5 POSITIVE COLORECTAL CANCER SCREENING USING COLOGUARD TEST: Primary | ICD-10-CM

## 2021-01-28 PROCEDURE — 99215 OFFICE O/P EST HI 40 MIN: CPT | Performed by: NURSE PRACTITIONER

## 2021-01-28 NOTE — PROGRESS NOTES
"Subjective   Malissa Silva is a 70 y.o. female who presents to discuss medications.     History of Present Illness   Saw Dr. Walsh a few days ago. Had colonoscopy 15 yrs ago with Dr. Walsh. Scared of colonoscopy. Thinks cologuard positive secondary to internal bleeding hemorrhoids. Thinks doesn't need to do colonoscopy secondary no FH. Worried secondary to previous poor reactions to anesthesia. Takes a long time to wake up and thinks anesthesiologists don't listen to her. Was upset that Dr. Walsh did not have her colonoscopy results from 15 yrs ago  Still crying every day about mother. Quit AD 1 week ago and no better no worse.   Sleep--belsomra works without side effects--now $47, other medications are now more expensive. Upset about this. Had significant ambien misuse in the past and lack of efficacy.   mobic--helps for about 22 hours. Thinks makes tired.  The following portions of the patient's history were reviewed and updated as appropriate: allergies, current medications, past family history, past medical history, past social history, past surgical history and problem list.    Review of Systems   Constitutional: Negative for activity change and unexpected weight change.   Respiratory: Negative.    Cardiovascular: Negative.    Endocrine: Negative for cold intolerance and heat intolerance.   Musculoskeletal: Positive for arthralgias and joint swelling.   Neurological: Positive for syncope. Negative for weakness, light-headedness and headaches.   Psychiatric/Behavioral: Positive for behavioral problems, decreased concentration and sleep disturbance. Negative for dysphoric mood, self-injury and suicidal ideas. The patient is nervous/anxious.      /76   Pulse 65   Temp 97.5 °F (36.4 °C) (Infrared)   Ht 160 cm (63\")   Wt 80.3 kg (177 lb)   SpO2 99%   BMI 31.35 kg/m²     Objective   Physical Exam  Vitals signs and nursing note reviewed.   Constitutional:       Appearance: She is well-developed. "   Eyes:      Conjunctiva/sclera: Conjunctivae normal.      Pupils: Pupils are equal, round, and reactive to light.   Neck:      Musculoskeletal: Normal range of motion and neck supple.      Thyroid: No thyromegaly.      Vascular: No JVD.      Trachea: No tracheal deviation.   Cardiovascular:      Rate and Rhythm: Normal rate and regular rhythm.      Heart sounds: Normal heart sounds.   Pulmonary:      Effort: Pulmonary effort is normal.      Breath sounds: Normal breath sounds.   Abdominal:      Palpations: Abdomen is soft.      Tenderness: There is no abdominal tenderness.   Musculoskeletal: Normal range of motion.      Right hand: She exhibits bony tenderness (mild DIP thickening) and deformity. She exhibits normal range of motion. Normal sensation noted. Normal strength noted.      Left hand: She exhibits bony tenderness (mild DIP thickening) and deformity. She exhibits normal range of motion.   Lymphadenopathy:      Cervical: No cervical adenopathy.   Skin:     General: Skin is warm and dry.   Neurological:      Mental Status: She is alert and oriented to person, place, and time.   Psychiatric:         Mood and Affect: Mood is anxious and depressed. Affect is inappropriate. Affect is not labile, blunt or angry.         Speech: Speech is tangential.         Behavior: Behavior normal.         Thought Content: Thought content is paranoid. Thought content does not include homicidal or suicidal ideation. Thought content does not include homicidal or suicidal plan.         Cognition and Memory: Cognition and memory normal.         Judgment: Judgment normal.       Assessment/Plan   Problems Addressed this Visit        Mental Health    Chronic depression       Musculoskeletal and Injuries    Arthritis of hand       Sleep    Insomnia      Other Visit Diagnoses     Positive colorectal cancer screening using Cologuard test    -  Primary      Diagnoses       Codes Comments    Positive colorectal cancer screening using  Cologuard test    -  Primary ICD-10-CM: R19.5  ICD-9-CM: 787.7     Chronic depression     ICD-10-CM: F32.9  ICD-9-CM: 311     Insomnia, unspecified type     ICD-10-CM: G47.00  ICD-9-CM: 780.52     Arthritis of hand     ICD-10-CM: M19.049  ICD-9-CM: 716.94         Positive cologuard--30 minutes discussing implications of positive cologuard and risk of not completing recommended colonoscopy. Discussed differences between between modified sedation and general anesthesia. Discussed her emotional reaction to doctors and perhaps a second opinion, might help her decide to pursue recommended colonoscopy.  Chronic depression--declines intervention for now. Recommend grief counseling  Insomnia--continue belsomra for now. Consider doxepin if not tried and if continues to be too expensive  Arthritis--ok for prn mobic  50 min EM-->50% counseling regarding her anxiety and depression  FU 6 months

## 2021-02-01 ENCOUNTER — TELEPHONE (OUTPATIENT)
Dept: SURGERY | Facility: CLINIC | Age: 71
End: 2021-02-01

## 2021-03-01 RX ORDER — SUVOREXANT 15 MG/1
TABLET, FILM COATED ORAL
Qty: 30 TABLET | Refills: 0 | Status: SHIPPED | OUTPATIENT
Start: 2021-03-01 | End: 2021-04-02

## 2021-03-09 ENCOUNTER — TRANSCRIBE ORDERS (OUTPATIENT)
Dept: LAB | Facility: HOSPITAL | Age: 71
End: 2021-03-09

## 2021-03-09 DIAGNOSIS — Z01.818 OTHER SPECIFIED PRE-OPERATIVE EXAMINATION: Primary | ICD-10-CM

## 2021-03-16 ENCOUNTER — TRANSCRIBE ORDERS (OUTPATIENT)
Dept: LAB | Facility: HOSPITAL | Age: 71
End: 2021-03-16

## 2021-03-16 DIAGNOSIS — Z01.818 OTHER SPECIFIED PRE-OPERATIVE EXAMINATION: Primary | ICD-10-CM

## 2021-03-17 ENCOUNTER — HOSPITAL ENCOUNTER (OUTPATIENT)
Dept: DIABETES SERVICES | Facility: HOSPITAL | Age: 71
Discharge: HOME | End: 2021-03-17
Admitting: PSYCHIATRY & NEUROLOGY

## 2021-03-17 PROCEDURE — 97802 MEDICAL NUTRITION INDIV IN: CPT | Performed by: DIETITIAN, REGISTERED

## 2021-03-26 ENCOUNTER — LAB (OUTPATIENT)
Dept: LAB | Facility: HOSPITAL | Age: 71
End: 2021-03-26

## 2021-03-26 DIAGNOSIS — Z01.818 OTHER SPECIFIED PRE-OPERATIVE EXAMINATION: ICD-10-CM

## 2021-03-26 PROCEDURE — C9803 HOPD COVID-19 SPEC COLLECT: HCPCS

## 2021-03-26 PROCEDURE — U0004 COV-19 TEST NON-CDC HGH THRU: HCPCS

## 2021-03-27 LAB — SARS-COV-2 RNA RESP QL NAA+PROBE: NOT DETECTED

## 2021-03-29 ENCOUNTER — ANESTHESIA (OUTPATIENT)
Dept: GASTROENTEROLOGY | Facility: HOSPITAL | Age: 71
End: 2021-03-29

## 2021-03-29 ENCOUNTER — HOSPITAL ENCOUNTER (OUTPATIENT)
Facility: HOSPITAL | Age: 71
Setting detail: HOSPITAL OUTPATIENT SURGERY
Discharge: HOME | End: 2021-03-29
Attending: SURGERY | Admitting: SURGERY

## 2021-03-29 ENCOUNTER — ANESTHESIA EVENT (OUTPATIENT)
Dept: GASTROENTEROLOGY | Facility: HOSPITAL | Age: 71
End: 2021-03-29

## 2021-03-29 VITALS
WEIGHT: 167 LBS | SYSTOLIC BLOOD PRESSURE: 126 MMHG | DIASTOLIC BLOOD PRESSURE: 71 MMHG | HEIGHT: 63 IN | RESPIRATION RATE: 18 BRPM | BODY MASS INDEX: 29.59 KG/M2 | HEART RATE: 71 BPM | OXYGEN SATURATION: 96 %

## 2021-03-29 PROCEDURE — 25010000002 GLUCAGON (RDNA) PER 1 MG: Performed by: SURGERY

## 2021-03-29 PROCEDURE — 25010000002 PROPOFOL 10 MG/ML EMULSION: Performed by: NURSE ANESTHETIST, CERTIFIED REGISTERED

## 2021-03-29 PROCEDURE — 45378 DIAGNOSTIC COLONOSCOPY: CPT | Performed by: SURGERY

## 2021-03-29 RX ORDER — PROPOFOL 10 MG/ML
VIAL (ML) INTRAVENOUS CONTINUOUS PRN
Status: DISCONTINUED | OUTPATIENT
Start: 2021-03-29 | End: 2021-03-29 | Stop reason: SURG

## 2021-03-29 RX ORDER — SODIUM CHLORIDE, SODIUM LACTATE, POTASSIUM CHLORIDE, CALCIUM CHLORIDE 600; 310; 30; 20 MG/100ML; MG/100ML; MG/100ML; MG/100ML
1000 INJECTION, SOLUTION INTRAVENOUS CONTINUOUS
Status: DISCONTINUED | OUTPATIENT
Start: 2021-03-29 | End: 2021-03-29 | Stop reason: HOSPADM

## 2021-03-29 RX ORDER — LIDOCAINE HYDROCHLORIDE 20 MG/ML
INJECTION, SOLUTION INFILTRATION; PERINEURAL AS NEEDED
Status: DISCONTINUED | OUTPATIENT
Start: 2021-03-29 | End: 2021-03-29 | Stop reason: SURG

## 2021-03-29 RX ADMIN — SODIUM CHLORIDE, POTASSIUM CHLORIDE, SODIUM LACTATE AND CALCIUM CHLORIDE 1000 ML: 600; 310; 30; 20 INJECTION, SOLUTION INTRAVENOUS at 10:27

## 2021-03-29 RX ADMIN — LIDOCAINE HYDROCHLORIDE 60 MG: 20 INJECTION, SOLUTION INFILTRATION; PERINEURAL at 11:10

## 2021-03-29 RX ADMIN — PROPOFOL 180 MCG/KG/MIN: 10 INJECTION, EMULSION INTRAVENOUS at 11:10

## 2021-03-29 NOTE — ANESTHESIA PREPROCEDURE EVALUATION
Anesthesia Evaluation     Patient summary reviewed and Nursing notes reviewed   history of anesthetic complications: prolonged sedation  NPO Solid Status: > 8 hours  NPO Liquid Status: > 4 hours           Airway   Mallampati: I  TM distance: >3 FB  Neck ROM: full  No difficulty expected  Dental - normal exam     Pulmonary - normal exam   (+) sleep apnea,   Cardiovascular - negative cardio ROS and normal exam        Neuro/Psych  (+) numbness, psychiatric history Depression,     GI/Hepatic/Renal/Endo - negative ROS     Musculoskeletal     Abdominal  - normal exam    Bowel sounds: normal.   Substance History - negative use     OB/GYN negative ob/gyn ROS         Other   arthritis,                    Anesthesia Plan    ASA 2     MAC

## 2021-03-29 NOTE — ANESTHESIA POSTPROCEDURE EVALUATION
"Patient: Malissa Silva    Procedure Summary     Date: 03/29/21 Room / Location:  [facility]    Anesthesia Start: 1107 Anesthesia Stop: 1130    Procedure: COLONOSCOPY TO CECUM  (N/A ) Diagnosis:       Positive colorectal cancer screening using DNA-based stool test      (Positive colorectal cancer screening using DNA-based stool test [R19.5])    Surgeons: Hermelinda Walsh MD Provider: Raulito Huggins MD    Anesthesia Type: MAC ASA Status: 2          Anesthesia Type: MAC    Vitals  Vitals Value Taken Time   /71 03/29/21 1155   Temp     Pulse 71 03/29/21 1155   Resp 18 03/29/21 1155   SpO2 96 % 03/29/21 1155           Post Anesthesia Care and Evaluation    Patient location during evaluation: PACU  Patient participation: complete - patient participated  Level of consciousness: awake  Pain score: 0  Pain management: adequate  Airway patency: patent  Anesthetic complications: No anesthetic complications  PONV Status: none  Cardiovascular status: acceptable  Respiratory status: acceptable  Hydration status: acceptable    Comments: /71 (BP Location: Left arm, Patient Position: Sitting)   Pulse 71   Resp 18   Ht 160 cm (63\")   Wt 75.8 kg (167 lb)   SpO2 96%   BMI 29.58 kg/m²       "

## 2021-03-30 ENCOUNTER — TELEPHONE (OUTPATIENT)
Dept: FAMILY MEDICINE CLINIC | Facility: CLINIC | Age: 71
End: 2021-03-30

## 2021-03-30 NOTE — TELEPHONE ENCOUNTER
Caller: Ricardo Malissa S    Relationship: Self    Best call back number: [phone]    What medications are you currently taking:   Current Outpatient Medications on File Prior to Visit   Medication Sig Dispense Refill   • Belsomra 15 MG tablet TAKE ONE TABLET BY MOUTH nightly 30 tablet 0   • estradiol (CLIMARA) 0.0375 MG/24HR Place 1 patch on the skin as directed by provider 1 (One) Time Per Week.       Current Facility-Administered Medications on File Prior to Visit   Medication Dose Route Frequency Provider Last Rate Last Admin   • [DISCONTINUED] lactated ringers infusion 1,000 mL  1,000 mL Intravenous Continuous Hermelinda Walsh MD   Stopped at 03/29/21 1144            Who prescribed you this medication: [facility]    What are your concerns: PATIENT STATES SHE IS HAVING SOME BLEEDING AND NEEDS TO KNOW IF THE MEDICATIONS ARE CAUSING IT.      PLEASE CALL AND ADVISE    How long have you had these concerns: 2 MONTHS

## 2021-03-30 NOTE — TELEPHONE ENCOUNTER
Rectal bleeding for 2 mos. Had colonoscopy which returned normal with no polyps, hemorrhoids, etc. There is blood with every bowel movement. Wants to know if one of her medications could be causing this? Currently on Belsomra which was started 4-5 mos ago. Took Meloxicam for 6-7 mos last year, did not work so stopped taking and now on advil 2-6 tablets daily.

## 2021-03-31 NOTE — TELEPHONE ENCOUNTER
Dr. Walsh noted possible history of fissure and if bleeding continues to call her for xrays of bowels. Repeat colonoscopy 5 yrs

## 2021-04-02 RX ORDER — SUVOREXANT 15 MG/1
TABLET, FILM COATED ORAL
Qty: 30 TABLET | Refills: 1 | Status: SHIPPED | OUTPATIENT
Start: 2021-04-02 | End: 2021-05-24

## 2021-05-23 DIAGNOSIS — G47.00 INSOMNIA, UNSPECIFIED TYPE: Primary | ICD-10-CM

## 2021-05-24 RX ORDER — SUVOREXANT 15 MG/1
TABLET, FILM COATED ORAL
Qty: 30 TABLET | Refills: 1 | Status: SHIPPED | OUTPATIENT
Start: 2021-05-24 | End: 2021-07-29

## 2021-07-06 ENCOUNTER — TELEPHONE (OUTPATIENT)
Dept: FAMILY MEDICINE CLINIC | Facility: CLINIC | Age: 71
End: 2021-07-06

## 2021-07-06 NOTE — TELEPHONE ENCOUNTER
PATIENT IS NEEDING REFILLS ON HER HYDROCODONE   HYDROcodone-acetaminophen (NORCO) 5-325    SHE IS WANTING TO CHANGE FROM Belsomra 15 MG tablet TO LUNESTA 2     SHE WANTED YOU TO KNOW THAT SHE IS ALLERGIC TO DYES IN MEDICATION AND THIS DOES NOT HAVE  ANY     SHE MADE AN APPT BUT SOONEST AVAILABLE WAS  7-29   AND ON WAITLIST         IF YOU HAVE ANY QUESTIONS CALL  Malissa Silva ([facility]) [phone]      PHARMACY:   [facility] - [place], [place] -[id number] [place] [address] [place] - [address] [facility] [phone] [facility] [phone]   [phone]

## 2021-07-29 ENCOUNTER — OFFICE VISIT (OUTPATIENT)
Dept: FAMILY MEDICINE CLINIC | Facility: CLINIC | Age: 71
End: 2021-07-29

## 2021-07-29 VITALS
HEART RATE: 66 BPM | DIASTOLIC BLOOD PRESSURE: 74 MMHG | BODY MASS INDEX: 30.12 KG/M2 | WEIGHT: 170 LBS | OXYGEN SATURATION: 98 % | HEIGHT: 63 IN | TEMPERATURE: 97.3 F | SYSTOLIC BLOOD PRESSURE: 124 MMHG

## 2021-07-29 DIAGNOSIS — M72.2 PLANTAR FASCIITIS: ICD-10-CM

## 2021-07-29 DIAGNOSIS — Z51.81 MEDICATION MONITORING ENCOUNTER: ICD-10-CM

## 2021-07-29 DIAGNOSIS — M54.10 RADICULOPATHY OF LEG: ICD-10-CM

## 2021-07-29 DIAGNOSIS — K62.5 BRIGHT RED RECTAL BLEEDING: ICD-10-CM
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
## 2021-07-29 DIAGNOSIS — G47.09 OTHER INSOMNIA: Primary | ICD-10-CM

## 2021-07-29 PROBLEM — M25.552 LEFT HIP PAIN: Status: ACTIVE | Noted: 2018-05-02

## 2021-07-29 PROCEDURE — 99214 OFFICE O/P EST MOD 30 MIN: CPT | Performed by: NURSE PRACTITIONER

## 2021-07-29 RX ORDER — HYDROCODONE BITARTRATE AND ACETAMINOPHEN 5; 325 MG/1; MG/1
1 TABLET ORAL EVERY 6 HOURS PRN
Qty: 30 TABLET | Refills: 0 | Status: SHIPPED | OUTPATIENT
Start: 2021-07-29 | End: 2022-09-26 | Stop reason: SDUPTHER

## 2021-07-29 RX ORDER — ESZOPICLONE 2 MG/1
2 TABLET, FILM COATED ORAL NIGHTLY
Qty: 30 TABLET | Refills: 0 | Status: SHIPPED | OUTPATIENT
Start: 2021-07-29 | End: 2021-08-31

## 2021-07-29 RX ORDER — HYDROCODONE BITARTRATE AND ACETAMINOPHEN 5; 325 MG/1; MG/1
1 TABLET ORAL EVERY 6 HOURS PRN
COMMUNITY
End: 2021-07-29 | Stop reason: SDUPTHER

## 2021-07-29 NOTE — PROGRESS NOTES
"Subjective   Malissa Silva is a 71 y.o. female who presents for a med check and follow up in pain in legs.     History of Present Illness   Neck bite x 2 months--tried hydrocortisone and witch hazel. Not prone to bug bites and unsure if bit  Colonoscopy secondary to bleeding, still having bright red bleeding at times. Bowels mostly solid and regular.   Does not think belsomra is working--can't sleep, wasn't sleeping well on ambien. Would like to try lunesta. Has seen sleep psychology in the past. Significant behavioral components. We have discussed at length  Gets random severe leg pain--HC prescription lasted more than 1 yr.   Tattoo--wants to get to cover angiodysplasia--had injected at a spa and did not fix  Plantar fasciitis--no longer walking--going to gym--inserts x 2-3 days some help. Pain mainly in center of heel    The following portions of the patient's history were reviewed and updated as appropriate: allergies, current medications, past family history, past medical history, past social history, past surgical history and problem list.    Review of Systems    /74   Pulse 66   Temp 97.3 °F (36.3 °C) (Infrared)   Ht 160 cm (63\")   Wt 77.1 kg (170 lb)   SpO2 98%   BMI 30.11 kg/m²     Objective   Physical Exam  Vitals and nursing note reviewed.   Constitutional:       General: She is not in acute distress.     Appearance: Normal appearance. She is well-developed. She is not ill-appearing or diaphoretic.   Pulmonary:      Effort: Pulmonary effort is normal.   Musculoskeletal:      Left foot: Normal. Normal range of motion. No tenderness or bony tenderness. Normal pulse.   Skin:     General: Skin is dry.      Capillary Refill: Capillary refill takes less than 2 seconds.      Findings: Lesion present.      Comments: R collarbone area with 7mm pearly papule without excoriation.   Neurological:      General: No focal deficit present.      Mental Status: She is alert and oriented to person, place, and " time.   Psychiatric:         Mood and Affect: Mood is anxious.         Behavior: Behavior normal.         Thought Content: Thought content normal.         Judgment: Judgment normal.         Assessment/Plan   Problems Addressed this Visit        Health Encounters    Medication monitoring encounter       Neuro    Radiculopathy of leg    Relevant Medications    HYDROcodone-acetaminophen (NORCO) 5-325 MG per tablet       Sleep    Insomnia - Primary    Relevant Medications    eszopiclone (Lunesta) 2 MG tablet      Other Visit Diagnoses     Bug bite, initial encounter        Relevant Orders    Ambulatory Referral to Dermatology    Bright red rectal bleeding        Plantar fasciitis          Diagnoses       Codes Comments    Other insomnia    -  Primary ICD-10-CM: G47.09  ICD-9-CM: 780.52     Bug bite, initial encounter     ICD-10-CM: W57.XXXA  ICD-9-CM: 919.4     Medication monitoring encounter     ICD-10-CM: Z51.81  ICD-9-CM: V58.83     Radiculopathy of leg     ICD-10-CM: M54.10  ICD-9-CM: 724.4     Bright red rectal bleeding     ICD-10-CM: K62.5  ICD-9-CM: 569.3     Plantar fasciitis     ICD-10-CM: M72.2  ICD-9-CM: 728.71         Insomnia--really recommend sleep psychology, but ok to try lunesta  Bug bite--if so significant scarring--much more consistent with appearance of basal cell. Recommend biopsy. Discussed potential for scarring. She elects dermatology referral  Medication monitoring--very intermittent use HC--ok to continue, for what sounds like radicular leg pain  Rectal bleeding--colonoscopy normal except diverticulosis--no hemorrhoids--nonspecific about stools--recommend adding fiber supplement  Plantar fasciitis--stretching and icing techniques taught. Continue inserts    STEFANIE Report:      As part of this patient's treatment plan, I am prescribing controlled substances. The patient has been made aware of appropriate use of such medications, including potential risk of somnolence, limited ability to drive  and /or work safely, and potential for dependence or overdose. It has also been made clear that these medications are for use by this patient only, without concomitant use of alcohol or other substances unless prescribed.    Patient has completed prescribing agreement detailing terms of continued prescribing of controlled substances, including monitoring STEFANIE reports, urine drug screening, and pill counts if necessary. The patient is aware that inappropriate use will result in cessation of prescribing such medications.    STEFANIE report has been reviewed by: ALLISON Marte on 07/29/21.  The report was not scanned into the patient's chart.    Date of last STEFANIE:  7/29/2021    History and physical exam exhibit continued safe and appropriate use of controlled substances.       This document is intended for medical expert use only. Reading of this document by patients and/or patient's family without participating medical staff guidance may result in misinterpretation and unintended morbidity.  Any interpretation of such data is the responsibility of the patient and/or family member responsible for the patient in concert with their primary or specialist providers, not to be left for sources of online searches such as [employer], [employer] or similar queries. Relying on these approaches to knowledge may result in misinterpretation, misguided goals of care and even death should patients or family members try recommendations outside of the realm of professional medical care in a supervised way.    Please allow 3-5 business days for recommendations based on new results    Go to the ER for any possible lifethreatening symptoms such as chest pain or shortness of air. I personally spent 30 minutes reviewing the chart before the visit, time with the patient, and time documenting the visit.

## 2021-08-04 ENCOUNTER — TELEPHONE (OUTPATIENT)
Dept: FAMILY MEDICINE CLINIC | Facility: CLINIC | Age: 71
End: 2021-08-04

## 2021-08-04 NOTE — TELEPHONE ENCOUNTER
Caller: Malissa Silva    Relationship to patient: Self    Best call back number: [phone]      Patient is needing: PATIENT CALLED IN AND WANTED TO SPEAK TO HAZEL ABOUT A REFERRAL TO A DERMATOLOGIST . PATIENT STATED SHE HAS NOT HEARD ANYTHING . HUB ATTEMPTED TO WARM TRANSFER NOT SUCCESSFUL .

## 2021-08-04 NOTE — TELEPHONE ENCOUNTER
SPOKE WITH PATIENT TO EXPLAIN THAT THE PROVIDER SHE REQUESTED DOES NOT ACCEPT HER INS, AND I HAD TO SEND TO A NEW PROVIDER TODAY. 8/4/21 TS

## 2021-08-30 DIAGNOSIS — G47.09 OTHER INSOMNIA: ICD-10-CM

## 2021-08-31 RX ORDER — ESZOPICLONE 2 MG/1
2 TABLET, FILM COATED ORAL NIGHTLY
Qty: 30 TABLET | Refills: 2 | Status: SHIPPED | OUTPATIENT
Start: 2021-08-31 | End: 2021-11-25

## 2021-09-03 ENCOUNTER — TELEPHONE (OUTPATIENT)
Dept: NEUROLOGY | Facility: CLINIC | Age: 71
End: 2021-09-03

## 2021-09-03 NOTE — TELEPHONE ENCOUNTER
Caller: DARÍO MORRISON    Relationship: SELF      Date of last appointment: 1-05-21    Issues/Supplies requested:CPAP        Additional Notes: PT CALLING STATING THAT SHE DON'T THING THAT SHE CAN SLEEP WITH THE HEAD GEAR,BUT  SHE DID TRY THE SLEEPING AID (3 DIFFERENT SLEEP MEDS) AND THEY DIDN'T WORK.ALSO TRY [employer] OIL AND IT DIDN'T WORK EITHER. SO NOW SHE IS WILLING TO TRY THE CPAP AND SHE KNOWS THERE IS SOME NEW ONES OUT AND THEY ARE LESS ADVASIVE. SHE NEEDS MORE INFO WHERE TO ORDER ONE FROM.    PLEASE CALL HER BACK [phone]   No

## 2021-09-07 NOTE — TELEPHONE ENCOUNTER
"Please review and advise. Patient was last seen 1/5/21. At that time she declined CPAP and went the route of an oral appliance. She states that cannot tolerate the \"head gear\" and is willing to try CPAP treatment now. Does she need a f/u to discuss or can an order be sent in?    Thank you  "

## 2021-11-21 DIAGNOSIS — G47.09 OTHER INSOMNIA: ICD-10-CM

## 2021-11-25 RX ORDER — ESZOPICLONE 2 MG/1
TABLET, FILM COATED ORAL
Qty: 30 TABLET | Refills: 2 | Status: SHIPPED | OUTPATIENT
Start: 2021-11-25 | End: 2022-03-09

## 2021-12-27 ENCOUNTER — APPOINTMENT (OUTPATIENT)
Dept: WOMENS IMAGING | Facility: HOSPITAL | Age: 71
End: 2021-12-27

## 2021-12-27 PROCEDURE — 77063 BREAST TOMOSYNTHESIS BI: CPT | Performed by: RADIOLOGY

## 2021-12-27 PROCEDURE — 77067 SCR MAMMO BI INCL CAD: CPT | Performed by: RADIOLOGY

## 2022-02-25 DIAGNOSIS — G47.09 OTHER INSOMNIA: ICD-10-CM

## 2022-03-09 RX ORDER — ESZOPICLONE 2 MG/1
TABLET, FILM COATED ORAL
Qty: 30 TABLET | Refills: 0 | Status: SHIPPED | OUTPATIENT
Start: 2022-03-09 | End: 2022-12-08

## 2022-03-24 ENCOUNTER — OFFICE VISIT (OUTPATIENT)
Dept: FAMILY MEDICINE CLINIC | Facility: CLINIC | Age: 72
End: 2022-03-24

## 2022-03-24 VITALS
HEART RATE: 58 BPM | DIASTOLIC BLOOD PRESSURE: 60 MMHG | TEMPERATURE: 98.3 F | SYSTOLIC BLOOD PRESSURE: 128 MMHG | OXYGEN SATURATION: 98 % | HEIGHT: 62 IN | RESPIRATION RATE: 20 BRPM | WEIGHT: 175 LBS | BODY MASS INDEX: 32.2 KG/M2

## 2022-03-24 DIAGNOSIS — M27.3 DRY SOCKET: ICD-10-CM

## 2022-03-24 DIAGNOSIS — Z79.899 HIGH RISK MEDICATION USE: ICD-10-CM

## 2022-03-24 DIAGNOSIS — G47.09 OTHER INSOMNIA: Primary | ICD-10-CM

## 2022-03-24 DIAGNOSIS — R53.82 CHRONIC FATIGUE: ICD-10-CM

## 2022-03-24 DIAGNOSIS — G47.33 OBSTRUCTIVE SLEEP APNEA SYNDROME: ICD-10-CM

## 2022-03-24 DIAGNOSIS — F32.A CHRONIC DEPRESSION: ICD-10-CM

## 2022-03-24 DIAGNOSIS — M25.579 PAIN IN JOINT INVOLVING ANKLE AND FOOT, UNSPECIFIED LATERALITY: ICD-10-CM

## 2022-03-24 DIAGNOSIS — Z13.220 LIPID SCREENING: ICD-10-CM

## 2022-03-24 PROCEDURE — 99214 OFFICE O/P EST MOD 30 MIN: CPT | Performed by: NURSE PRACTITIONER

## 2022-03-24 RX ORDER — CLINDAMYCIN HYDROCHLORIDE 150 MG/1
CAPSULE ORAL
COMMUNITY
Start: 2022-03-22 | End: 2022-12-08

## 2022-03-24 RX ORDER — TRAZODONE HYDROCHLORIDE 100 MG/1
100 TABLET ORAL NIGHTLY
Qty: 30 TABLET | Refills: 0 | Status: SHIPPED | OUTPATIENT
Start: 2022-03-24 | End: 2022-04-20

## 2022-04-20 DIAGNOSIS — G47.09 OTHER INSOMNIA: ICD-10-CM

## 2022-04-20 RX ORDER — TRAZODONE HYDROCHLORIDE 100 MG/1
100 TABLET ORAL NIGHTLY
Qty: 30 TABLET | Refills: 0 | Status: SHIPPED | OUTPATIENT
Start: 2022-04-20 | End: 2022-05-20

## 2022-05-13 ENCOUNTER — DOCUMENTATION (OUTPATIENT)
Dept: FAMILY MEDICINE CLINIC | Facility: CLINIC | Age: 72
End: 2022-05-13

## 2022-05-20 DIAGNOSIS — G47.09 OTHER INSOMNIA: ICD-10-CM

## 2022-05-20 RX ORDER — TRAZODONE HYDROCHLORIDE 100 MG/1
100 TABLET ORAL NIGHTLY
Qty: 30 TABLET | Refills: 0 | Status: SHIPPED | OUTPATIENT
Start: 2022-05-20 | End: 2022-06-17

## 2022-06-17 DIAGNOSIS — G47.09 OTHER INSOMNIA: ICD-10-CM

## 2022-06-17 RX ORDER — TRAZODONE HYDROCHLORIDE 100 MG/1
100 TABLET ORAL NIGHTLY
Qty: 30 TABLET | Refills: 0 | Status: SHIPPED | OUTPATIENT
Start: 2022-06-17 | End: 2022-07-18

## 2022-07-18 DIAGNOSIS — G47.09 OTHER INSOMNIA: ICD-10-CM

## 2022-07-18 RX ORDER — TRAZODONE HYDROCHLORIDE 100 MG/1
100 TABLET ORAL NIGHTLY
Qty: 30 TABLET | Refills: 0 | Status: SHIPPED | OUTPATIENT
Start: 2022-07-18 | End: 2022-09-26 | Stop reason: SDUPTHER

## 2022-09-26 DIAGNOSIS — M54.10 RADICULOPATHY OF LEG: ICD-10-CM

## 2022-09-26 DIAGNOSIS — G47.09 OTHER INSOMNIA: ICD-10-CM

## 2022-09-26 RX ORDER — HYDROCODONE BITARTRATE AND ACETAMINOPHEN 5; 325 MG/1; MG/1
1 TABLET ORAL EVERY 6 HOURS PRN
Qty: 30 TABLET | Refills: 0 | Status: SHIPPED | OUTPATIENT
Start: 2022-09-26

## 2022-09-26 RX ORDER — TRAZODONE HYDROCHLORIDE 100 MG/1
100 TABLET ORAL NIGHTLY
Qty: 30 TABLET | Refills: 0 | Status: SHIPPED | OUTPATIENT
Start: 2022-09-26 | End: 2022-10-24

## 2022-10-24 DIAGNOSIS — G47.09 OTHER INSOMNIA: ICD-10-CM

## 2022-10-24 RX ORDER — TRAZODONE HYDROCHLORIDE 100 MG/1
100 TABLET ORAL NIGHTLY
Qty: 30 TABLET | Refills: 0 | Status: SHIPPED | OUTPATIENT
Start: 2022-10-24 | End: 2022-11-25

## 2022-11-25 DIAGNOSIS — G47.09 OTHER INSOMNIA: ICD-10-CM

## 2022-11-25 RX ORDER — TRAZODONE HYDROCHLORIDE 100 MG/1
100 TABLET ORAL NIGHTLY
Qty: 30 TABLET | Refills: 0 | Status: SHIPPED | OUTPATIENT
Start: 2022-11-25 | End: 2022-12-08

## 2022-12-02 ENCOUNTER — TELEPHONE (OUTPATIENT)
Dept: NEUROLOGY | Facility: CLINIC | Age: 72
End: 2022-12-02

## 2022-12-02 NOTE — TELEPHONE ENCOUNTER
Provider: XI PALMER MD    Caller: DARÍO    Relationship to Patient: SELF    Phone Number: [phone]    Reason for Call: CALLING TO SEE IF PROVIDER GETS THE REPORT FROM HER CPAP.  STATES SHE IS STILL WAKING UP GASPING FOR AIR.    SCHEDULED APPT FOR PT    When was the patient last seen: 1-5-21

## 2022-12-05 NOTE — TELEPHONE ENCOUNTER
Caller: Malissa Silva    Relationship: Self    Best call back number: [phone]    What is the best time to reach you: ANYTIME    Who are you requesting to speak with (clinical staff, provider,  specific staff member): CLINICAL    Do you know the name of the person who called: GISEL    What was the call regarding: APPOINTMENT AND DME COMPANY    Do you require a callback: IF NEEDED    I SEEN PROVIDER WAS NEEDING DME COMPANY FOR DOWNLOAD PER [employer] MESSAGE - THE DME COMPANY IS : [employer] [facility]

## 2022-12-05 NOTE — TELEPHONE ENCOUNTER
Lm for patient letting her know that she is way overdue for follow up- she never returned after getting her CPAP and has not been seen since 9/21    Lm for her letting her know that she has to be seen to discuss- appt moved up to 12.14.22 at 11:30 am

## 2022-12-08 ENCOUNTER — OFFICE VISIT (OUTPATIENT)
Dept: FAMILY MEDICINE CLINIC | Facility: CLINIC | Age: 72
End: 2022-12-08

## 2022-12-08 VITALS
SYSTOLIC BLOOD PRESSURE: 124 MMHG | HEIGHT: 62 IN | RESPIRATION RATE: 16 BRPM | OXYGEN SATURATION: 96 % | TEMPERATURE: 97.1 F | BODY MASS INDEX: 32.94 KG/M2 | DIASTOLIC BLOOD PRESSURE: 72 MMHG | HEART RATE: 67 BPM | WEIGHT: 179 LBS

## 2022-12-08 DIAGNOSIS — R79.9 ABNORMAL FINDING OF BLOOD CHEMISTRY, UNSPECIFIED: ICD-10-CM

## 2022-12-08 DIAGNOSIS — R63.5 WEIGHT GAIN: ICD-10-CM

## 2022-12-08 DIAGNOSIS — E78.5 DYSLIPIDEMIA: ICD-10-CM

## 2022-12-08 DIAGNOSIS — R53.82 CHRONIC FATIGUE: ICD-10-CM

## 2022-12-08 DIAGNOSIS — Z00.00 MEDICARE ANNUAL WELLNESS VISIT, SUBSEQUENT: Primary | ICD-10-CM

## 2022-12-08 DIAGNOSIS — G47.09 OTHER INSOMNIA: ICD-10-CM

## 2022-12-08 DIAGNOSIS — Z12.31 ENCOUNTER FOR SCREENING MAMMOGRAM FOR MALIGNANT NEOPLASM OF BREAST: ICD-10-CM

## 2022-12-08 DIAGNOSIS — Z13.220 LIPID SCREENING: ICD-10-CM

## 2022-12-08 DIAGNOSIS — F41.9 ANXIETY: ICD-10-CM

## 2022-12-08 DIAGNOSIS — Z78.0 POSTMENOPAUSAL: ICD-10-CM

## 2022-12-08 PROCEDURE — 1159F MED LIST DOCD IN RCRD: CPT

## 2022-12-08 PROCEDURE — 1170F FXNL STATUS ASSESSED: CPT

## 2022-12-08 PROCEDURE — G0439 PPPS, SUBSEQ VISIT: HCPCS

## 2022-12-08 PROCEDURE — 99214 OFFICE O/P EST MOD 30 MIN: CPT

## 2022-12-08 PROCEDURE — 1126F AMNT PAIN NOTED NONE PRSNT: CPT

## 2022-12-08 RX ORDER — TRAZODONE HYDROCHLORIDE 150 MG/1
150 TABLET ORAL NIGHTLY
Qty: 90 TABLET | Refills: 0 | Status: SHIPPED | OUTPATIENT
Start: 2022-12-08

## 2022-12-08 RX ORDER — BUSPIRONE HYDROCHLORIDE 5 MG/1
5 TABLET ORAL NIGHTLY PRN
Qty: 30 TABLET | Refills: 0 | Status: SHIPPED | OUTPATIENT
Start: 2022-12-08

## 2022-12-08 RX ORDER — TRAZODONE HYDROCHLORIDE 100 MG/1
100 TABLET ORAL NIGHTLY
Qty: 30 TABLET | Refills: 0 | Status: CANCELLED | OUTPATIENT
Start: 2022-12-08

## 2022-12-08 NOTE — PROGRESS NOTES
The ABCs of the Annual Wellness Visit  Subsequent Medicare Wellness Visit    Subjective      Malissa Silva is a 72 y.o. female who presents for a Subsequent Medicare Wellness Visit.    The following portions of the patient's history were reviewed and   updated as appropriate: allergies, current medications, past family history, past medical history, past social history and past surgical history.    Compared to one year ago, the patient feels her physical   health is the same.    Compared to one year ago, the patient feels her mental   health is the same.    Recent Hospitalizations:  She was not admitted to the hospital during the last year.       Current Medical Providers:  Patient Care Team:  Dustin Eubanks APRN as PCP - General (Family Medicine)    Outpatient Medications Prior to Visit   Medication Sig Dispense Refill   • estradiol (CLIMARA) 0.0375 MG/24HR Place 1 patch on the skin as directed by provider 1 (One) Time Per Week.     • HYDROcodone-acetaminophen (NORCO) 5-325 MG per tablet Take 1 tablet by mouth Every 6 (Six) Hours As Needed for Moderate Pain. Gets 1 script per year 30 tablet 0   • eszopiclone (LUNESTA) 2 MG tablet TAKE ONE TABLET BY MOUTH EVERY NIGHT AT BEDTIME 30 tablet 0   • traZODone (DESYREL) 100 MG tablet TAKE 1 TABLET BY MOUTH EVERY NIGHT 30 tablet 0   • clindamycin (CLEOCIN) 150 MG capsule TAKE 1 CAPSULE BY MOUTH FOUR TIMES DAILY UNTIL GONE       No facility-administered medications prior to visit.       Opioid medication/s are on active medication list.  and I have evaluated her active treatment plan and pain score trends (see table).  Vitals:    12/08/22 1512   PainSc: 0-No pain     I have reviewed the chart for potential of high risk medication and harmful drug interactions in the elderly.            Aspirin is not on active medication list.  Aspirin use is not indicated based on review of current medical condition/s. Risk of harm outweighs potential benefits.  .    Patient Active Problem  "List   Diagnosis   • Arthritis of hand   • Coxitis   • Muscle strain of chest wall   • Chronic depression   • Fatigue   • Insomnia   • Obstructive sleep apnea syndrome   • Antinuclear factor positive   • Restless legs syndrome   • Scapulocostal syndrome   • Increased frequency of urination   • Status post total replacement of right hip   • Encounter for monitoring primary estrogen replacement therapy   • Radiculopathy of leg   • Lateral femoral cutaneous neuropathy, right   • Left hip pain   • Primary osteoarthritis of left hip   • Lipid screening   • Medication monitoring encounter   • Somatic dysfunction   • Injury to fingernail of right hand   • Positive colorectal cancer screening using DNA-based stool test   • Left hip pain     Advance Care Planning  Advance Directive is not on file.  ACP discussion was held with the patient during this visit. Patient does not have an advance directive, information provided.     Objective    Vitals:    12/08/22 1512   BP: 124/72   Pulse: 67   Resp: 16   Temp: 97.1 °F (36.2 °C)   TempSrc: Temporal   SpO2: 96%   Weight: 81.2 kg (179 lb)   Height: 157.5 cm (62\")   PainSc: 0-No pain     Estimated body mass index is 32.74 kg/m² as calculated from the following:    Height as of this encounter: 157.5 cm (62\").    Weight as of this encounter: 81.2 kg (179 lb).    BMI is >= 30 and <35. (Class 1 Obesity). The following options were offered after discussion;: weight loss educational material (shared in after visit summary), exercise counseling/recommendations and nutrition counseling/recommendations      Does the patient have evidence of cognitive impairment?   No            HEALTH RISK ASSESSMENT    Smoking Status:  Social History     Tobacco Use   Smoking Status Never   Smokeless Tobacco Never     Alcohol Consumption:  Social History     Substance and Sexual Activity   Alcohol Use Yes    Comment: rare     Fall Risk Screen:    BHASKARADI Fall Risk Assessment has not been " completed.    Depression Screening:  PHQ-2/PHQ-9 Depression Screening 12/8/2022   Retired Total Score -   Little Interest or Pleasure in Doing Things 0-->not at all   Feeling Down, Depressed or Hopeless 0-->not at all   PHQ-9: Brief Depression Severity Measure Score 0       Health Habits and Functional and Cognitive Screening:  Functional & Cognitive Status 10/30/2020   Do you have difficulty preparing food and eating? No   Do you have difficulty bathing yourself, getting dressed or grooming yourself? No   Do you have difficulty using the toilet? No   Do you have difficulty moving around from place to place? No   Do you have trouble with steps or getting out of a bed or a chair? No   Current Diet Well Balanced Diet   Dental Exam Not up to date   Eye Exam Up to date   Exercise (times per week) 3 times per week   Current Exercises Include Walking   Current Exercise Activities Include -   Do you need help using the phone?  No   Are you deaf or do you have serious difficulty hearing?  No   Do you need help with transportation? No   Do you need help shopping? No   Do you need help preparing meals?  No   Do you need help with housework?  No   Do you need help with laundry? No   Do you need help taking your medications? No   Do you need help managing money? No   Do you ever drive or ride in a car without wearing a seat belt? No   Have you felt unusual stress, anger or loneliness in the last month? No   Who do you live with? Spouse   If you need help, do you have trouble finding someone available to you? No   Have you been bothered in the last four weeks by sexual problems? No   Do you have difficulty concentrating, remembering or making decisions? No       Age-appropriate Screening Schedule:  Refer to the list below for future screening recommendations based on patient's age, sex and/or medical conditions. Orders for these recommended tests are listed in the plan section. The patient has been provided with a written  plan.    Health Maintenance   Topic Date Due   • DXA SCAN  Never done   • TDAP/TD VACCINES (1 - Tdap) Never done   • MAMMOGRAM  12/07/2022   • INFLUENZA VACCINE  03/31/2023 (Originally 8/1/2022)                CMS Preventative Services Quick Reference  Risk Factors Identified During Encounter:    Chronic Pain: OTC analgesics as needed. Proper dosing schedule discussed.   Fall Risk-High or Moderate: Discussed Fall Prevention in the home  Polypharmacy: Medication List reviewed    The above risks/problems have been discussed with the patient.  Pertinent information has been shared with the patient in the After Visit Summary.    Diagnoses and all orders for this visit:    1. Medicare annual wellness visit, subsequent (Primary)  -     CBC & Differential  -     Comprehensive Metabolic Panel  -     Lipid Panel    2. Other insomnia  -     Ambulatory Referral to Sleep Medicine  -     traZODone (DESYREL) 150 MG tablet; Take 1 tablet by mouth Every Night.  Dispense: 90 tablet; Refill: 0    3. Anxiety  -     busPIRone (BUSPAR) 5 MG tablet; Take 1 tablet by mouth At Night As Needed (anxiety).  Dispense: 30 tablet; Refill: 0  -     TSH    4. Postmenopausal  -     DEXA Bone Density Axial; Future    5. Weight gain  -     Comprehensive Metabolic Panel  -     Hemoglobin A1c  -     Lipid Panel  -     TSH    6. Lipid screening  -     Lipid Panel    7. Chronic fatigue  -     CBC & Differential  -     Comprehensive Metabolic Panel  -     Hemoglobin A1c  -     TSH    8. Abnormal finding of blood chemistry, unspecified  -     Hemoglobin A1c  -     Lipid Panel    9. Dyslipidemia  -     Lipid Panel        Follow Up:   Next Medicare Wellness visit to be scheduled in 1 year.      An After Visit Summary and PPPS were made available to the patient.

## 2022-12-08 NOTE — PROGRESS NOTES
"Subjective   Malissa Silva is a 72 y.o. female. Who presents with multiple complaints and medicare annual    History of Present Illness     Hoarseness for a few years. getting worse. Dry cough. No swallowing issues. No acid reflux    Insomnia- has tried multiple medications: Previous trials of high dose ambien, ambien CR, high dose temazepam 30, mirtazapine, hydroxyzine, melatonin, amitriptyline. Currently on trazodone 100 mg. States nothing has worked. Had some relief with ambien for a while but stopped working. Thinks trazodone only works at times. Cough syrup helps some but benadryl does not help.   Thinks a lot at night, anxious. Irritable     Has difficulty falling asleep Every night. Sometimes cannot sleep at all.   Has also tried Relaxation methods with no relief. States gets up and eats in middle of night due to this. gaining weight.     Hx LALO. Has CPAP. Need to f.u . States unhappy with neurologist as did not address her sleeping issues. Would like new referral  Anxiety.     Has mammogram scheduled. Needs referral for dexa.   C,o weight gain. States \"has to eat\" frequently otherwise feels dizzy. Also gets up in middle night and eats. Does not understand why gaining weight. Has been working out routinely, goes to gym. States has never been this big    The following portions of the patient's history were reviewed and updated as appropriate: allergies, current medications, past family history, past medical history, past social history and past surgical history.    Review of Systems   Constitutional: Negative for fatigue and unexpected weight loss.   HENT: Positive for voice change. Negative for congestion and sore throat.    Eyes: Negative for visual disturbance.   Respiratory: Positive for cough. Negative for shortness of breath and wheezing.    Cardiovascular: Negative.    Genitourinary: Negative for difficulty urinating.   Neurological: Negative for dizziness and headache.   Psychiatric/Behavioral: " "Positive for sleep disturbance and stress. Negative for self-injury, suicidal ideas and depressed mood. The patient is nervous/anxious.        Objective    /72   Pulse 67   Temp 97.1 °F (36.2 °C) (Temporal)   Resp 16   Ht 157.5 cm (62\")   Wt 81.2 kg (179 lb)   SpO2 96%   BMI 32.74 kg/m²     Physical Exam  Constitutional:       Appearance: Normal appearance. She is not ill-appearing.   Neck:      Vascular: No carotid bruit.   Cardiovascular:      Rate and Rhythm: Normal rate and regular rhythm.      Pulses: Normal pulses.      Heart sounds: Normal heart sounds, S1 normal and S2 normal. No murmur heard.  Pulmonary:      Effort: Pulmonary effort is normal. No respiratory distress.      Breath sounds: Normal breath sounds.   Musculoskeletal:      Right lower leg: No edema.      Left lower leg: No edema.   Neurological:      General: No focal deficit present.      Mental Status: She is alert and oriented to person, place, and time.      Cranial Nerves: No dysarthria.      Gait: Gait is intact.   Psychiatric:         Attention and Perception: Attention normal.         Mood and Affect: Mood normal.         Speech: Speech normal.         Behavior: Behavior normal.         Thought Content: Thought content normal.         Cognition and Memory: Cognition normal.         Judgment: Judgment normal.           Assessment & Plan   Diagnoses and all orders for this visit:    1. Medicare annual wellness visit, subsequent (Primary)  -     CBC & Differential  -     Comprehensive Metabolic Panel  -     Lipid Panel  -     Counseled on recommended screenings and vaccines. Defers vaccines. [facility] and needs referral for dexa. Eat a healthy diet and exercise routinely. Avoid smoking/alcohol and drugs. Use seatbelt 100% of time.     2. Other insomnia  -     Ambulatory Referral to Sleep Medicine  -     traZODone (DESYREL) 150 MG tablet; Take 1 tablet by mouth Every Night.  Dispense: 90 tablet; " Refill: 0  -     Lengthy discussion about tx options. Think anxiety definitely contributor and recommend tx. Defers SSRis but ok to trial buspar.   Increase trazodone as thinks helps a little.   Refer to sleep specialist for follow up on LALO and further advise on insomnia as has tried multiple tx with no success.     3. Anxiety  -     busPIRone (BUSPAR) 5 MG tablet; Take 1 tablet by mouth At Night As Needed (anxiety).  Dispense: 30 tablet; Refill: 0  -     TSH  -     Symptoms and tx discussed. Common SE of medication discussed. Use in evening and see if helps with symptoms and improve sleep.     4. Postmenopausal  -     DEXA Bone Density Axial; Future    5. Weight gain  -     Comprehensive Metabolic Panel  -     Hemoglobin A1c  -     Lipid Panel  -     TSH  -      Frequent eating and not sleeping, anxiety could be contributing. Advised healthy diet. Even if eats frequently, try to limit portions, and chose healthy, low yuliya foods. Routine exercise.   Will check labs for other causes.     6. Lipid screening  -     Lipid Panel    7. Chronic fatigue  -     CBC & Differential  -     Comprehensive Metabolic Panel  -     Hemoglobin A1c  -     TSH  -      Same as above    8. Abnormal finding of blood chemistry, unspecified  -     Hemoglobin A1c  -     Lipid Panel    9. Dyslipidemia  -     Lipid Panel        Follow up: 6 months, sooner as needed.      - Pt agrees with plan of care and states no further concerns or questions today    This document is intended for medical expert use only. Reading of this document by patients and/or patient's family without participating medical staff guidance may result in misinterpretation and unintended morbidity.  Any interpretation of such data is the responsibility of the patient and/or family member responsible for the patient in concert with their primary or specialist providers, not to be left for sources of online searches such as [employer], [employer] or similar queries. Relying on these  approaches to knowledge may result in misinterpretation, misguided goals of care and even death should patients or family members try recommendations outside of the realm of professional medical care in a supervised way.     Please allow 3-5 business days for recommendations based on new results     Go to the ER for any possible lifethreatening symptoms such as chest pain or shortness of air.      I personally spent 30 minutes with this patient, preparing for the visit, reviewing tests, obtaining and/or reviewing a separately obtained history, performing a medically appropriate examination and/or evaluation , counseling and educating the patient/family/caregiver, ordering medications, tests, or procedures, documenting information in the medical record and independently interpreting results.

## 2022-12-09 DIAGNOSIS — E78.2 MIXED HYPERLIPIDEMIA: Primary | ICD-10-CM

## 2022-12-09 LAB
ALBUMIN SERPL-MCNC: 4.4 G/DL (ref 3.7–4.7)
ALBUMIN/GLOB SERPL: 1.8 {RATIO} (ref 1.2–2.2)
ALP SERPL-CCNC: 63 IU/L (ref 44–121)
ALT SERPL-CCNC: 23 IU/L (ref 0–32)
AST SERPL-CCNC: 20 IU/L (ref 0–40)
BASOPHILS # BLD AUTO: 0 X10E3/UL (ref 0–0.2)
BASOPHILS NFR BLD AUTO: 0 %
BILIRUB SERPL-MCNC: 0.3 MG/DL (ref 0–1.2)
BUN SERPL-MCNC: 17 MG/DL (ref 8–27)
BUN/CREAT SERPL: 21 (ref 12–28)
CALCIUM SERPL-MCNC: 9.3 MG/DL (ref 8.7–10.3)
CHLORIDE SERPL-SCNC: 99 MMOL/L (ref 96–106)
CHOLEST SERPL-MCNC: 200 MG/DL (ref 100–199)
CO2 SERPL-SCNC: 24 MMOL/L (ref 20–29)
CREAT SERPL-MCNC: 0.8 MG/DL (ref 0.57–1)
EGFRCR SERPLBLD CKD-EPI 2021: 78 ML/MIN/1.73
EOSINOPHIL # BLD AUTO: 0.2 X10E3/UL (ref 0–0.4)
EOSINOPHIL NFR BLD AUTO: 2 %
ERYTHROCYTE [DISTWIDTH] IN BLOOD BY AUTOMATED COUNT: 14.2 % (ref 11.7–15.4)
GLOBULIN SER CALC-MCNC: 2.5 G/DL (ref 1.5–4.5)
GLUCOSE SERPL-MCNC: 97 MG/DL (ref 70–99)
HBA1C MFR BLD: 5.5 % (ref 4.8–5.6)
HCT VFR BLD AUTO: 38.2 % (ref 34–46.6)
HDLC SERPL-MCNC: 47 MG/DL
HGB BLD-MCNC: 12.5 G/DL (ref 11.1–15.9)
IMM GRANULOCYTES # BLD AUTO: 0 X10E3/UL (ref 0–0.1)
IMM GRANULOCYTES NFR BLD AUTO: 0 %
LDLC SERPL CALC-MCNC: 100 MG/DL (ref 0–99)
LYMPHOCYTES # BLD AUTO: 2 X10E3/UL (ref 0.7–3.1)
LYMPHOCYTES NFR BLD AUTO: 29 %
MCH RBC QN AUTO: 28.5 PG (ref 26.6–33)
MCHC RBC AUTO-ENTMCNC: 32.7 G/DL (ref 31.5–35.7)
MCV RBC AUTO: 87 FL (ref 79–97)
MONOCYTES # BLD AUTO: 0.6 X10E3/UL (ref 0.1–0.9)
MONOCYTES NFR BLD AUTO: 8 %
NEUTROPHILS # BLD AUTO: 4 X10E3/UL (ref 1.4–7)
NEUTROPHILS NFR BLD AUTO: 61 %
ONE SPECIMEN IDENTIFIER: NORMAL
PLATELET # BLD AUTO: 307 X10E3/UL (ref 150–450)
POTASSIUM SERPL-SCNC: 4.5 MMOL/L (ref 3.5–5.2)
PROT SERPL-MCNC: 6.9 G/DL (ref 6–8.5)
RBC # BLD AUTO: 4.39 X10E6/UL (ref 3.77–5.28)
SODIUM SERPL-SCNC: 138 MMOL/L (ref 134–144)
TRIGL SERPL-MCNC: 317 MG/DL (ref 0–149)
TSH SERPL DL<=0.005 MIU/L-ACNC: 1.26 UIU/ML (ref 0.45–4.5)
VLDLC SERPL CALC-MCNC: 53 MG/DL (ref 5–40)
WBC # BLD AUTO: 6.7 X10E3/UL (ref 3.4–10.8)

## 2022-12-09 RX ORDER — ATORVASTATIN CALCIUM 20 MG/1
20 TABLET, FILM COATED ORAL DAILY
Qty: 90 TABLET | Refills: 0 | Status: SHIPPED | OUTPATIENT
Start: 2022-12-09 | End: 2023-03-14

## 2022-12-09 NOTE — PROGRESS NOTES
Please call pt with abnormal lab results    Cholesterol and triglycerides very high. Would recommend starting on treatment- I will send in crestor, take once nightly.   Also Work on healthy diet and exercise. Limit bad fats such as fried foods, whole fat dairy products and red meats and increase vegetables, whole grains, fish, nuts and olive oil. Limit sugar and salt. Drink at least 64 oz water daily.     Blood levels within normal limits  Kidney, liver and thyroid function stable    Repeat fasting lipids in 3 months- stop by lab will place order.  Call if questions.   Follow up in office 6 months.

## 2022-12-14 ENCOUNTER — OFFICE VISIT (OUTPATIENT)
Dept: NEUROLOGY | Facility: CLINIC | Age: 72
End: 2022-12-14

## 2022-12-14 VITALS
OXYGEN SATURATION: 98 % | HEART RATE: 68 BPM | HEIGHT: 62 IN | DIASTOLIC BLOOD PRESSURE: 72 MMHG | WEIGHT: 172.6 LBS | BODY MASS INDEX: 31.76 KG/M2 | SYSTOLIC BLOOD PRESSURE: 126 MMHG

## 2022-12-14 DIAGNOSIS — G47.33 OBSTRUCTIVE SLEEP APNEA: Primary | ICD-10-CM

## 2022-12-14 PROCEDURE — 99214 OFFICE O/P EST MOD 30 MIN: CPT | Performed by: PSYCHIATRY & NEUROLOGY

## 2022-12-14 NOTE — PATIENT INSTRUCTIONS
Cleaning:    Masks and Nasal Pillows:  Wash once a day after use in the morning.  Use warm water and detergent with no lotion (Sigrid, Jarad, etc) and rinse in warm water.  Allow to air dry.    Tubing:  Wash once a week with warm water and detergent with no lotion OR 1 part vinegar to 3 parts water.  Soak both inside and outside of the tubing and rinse in warm water.  Allow to air dry by hanging over shower gregor.    Headgear:  Can machine wash on gentle cycle with cold water or hand wash in mild detergent and rinse in cold water.  Allow to air dry.  Do not take apart (may be hard to get back together!)    Humidifier chamber:  Empty daily and add 1 cup of fresh distilled or boiled water.  Wash weekly in warm soapy water or vinegar solution and rinse in warm water.    Changing of supplies:    Every 2 weeks:  New nasal cushion or pillows; change filter    Monthly:  New full face cushion    Every 3 months:  New mask frame, new tubing    Every 6 months:  New headgear, new humidifier chamber, new chinstrap     Weight loss tips:    Pay attention to what you eat!  Exercise is important, but most of weight loss is nutrition (ask for a referral for a nutritionist if you are interested).    Avoid snacks.  Even health ones add calories.    Try a meal delivery service like Get[employer]-Post or [employer]CH[employer].    Try free aps like MyFitnessPal or plans like Weight Watchers, Noom, or Eat Right Now.    Consider reading The Obesity Code (Available on Amazon):

## 2022-12-14 NOTE — PROGRESS NOTES
Subjective:     Patient ID: Malissa Silva is a 72 y.o. female.    History of Present Illness  The patient is a 72-year-old female with history of insomnia and mild sleep apnea who presents to the neurology clinic today as an established patient for follow-up for sleep apnea.  The patient was last seen on January 5, 2021.  She was new patient back in September 2020.  Due to concerns for sleep apnea we did a home sleep study that showed mild sleep apnea.  She had an in lab study prior to that also showed sleep apnea.  In the past she tried CPAP 15 years prior but did not tolerate it.  She had a dentist May current oral appliance.  That did not improve her insomnia and she never had a sleep study after it was made.  Reportedly she had some dental work done and it no longer fit.  At her last visit she wanted an oral appliance again but then decided that she wanted to go with CPAP.  She says she sleeps better because it helps with her headaches but is waking up gasping.  She would like to switch her medical supply company to naps.  She reports that cognitive behavioral therapy for insomnia by Dr. Cornejo was not helpful in the past.  She is also seeing a nutritionist that she did not feel was very beneficial.    Initial diagnostic sleep study: Nov 2020, AHI=10   Weight: 172 (same today)  PAP titration study or autoPAP: autoPAP  When was PAP started? Dec 2021  Level: 4-16  Mask type: nasal pillows   Comfortable? yes   Major leak? no   Still snoring? No bed partner, does not wake self snoring, wakes up gasping (Started about a month ago, happens 2-3/week, but none for the past week)  DME company: [employer]  Feel better or more refreshed? No longer waking up with headaches  Cleaning equipment? Cleans tubing once per week with soap and water and humidifier once a day.  Cleans her mask once per week.  Compliance: The patient used her machine over 4 hours 80% of the time for the last 30 days.  Her residual AHI is 0.   Naps?  no    ESS:  1. Sitting and reading? 0  2. Watching TV? 0  3. Sitting inactive in a public place? 0  4. As a passenger in a car for an hour without a break? 0  5. Lying down to rest in the afternoon when able? 1  6. Sitting and talking to someone? 0  7. Sitting quietly after lunch without EtOH? 0  8. In a car while stopped for a few minutes in traffic? 0  Total: 1    The following portions of the patient's history were reviewed and updated as appropriate: allergies, current medications, past family history, past medical history, past social history, past surgical history and problem list.    Review of Systems     Objective:    Neurological Exam    Physical Exam    Assessment/Plan:    The patient is a 72-year-old female with history of insomnia and mild obstructive sleep apnea on AutoPap who presents today for follow-up.    1.  Mild obstructive sleep apnea on AutoPap-the patient still reports gasping for air at night.  I would like to further evaluate her with an in lab CPAP titration study.  We had a further discussion regarding her insomnia and weight loss.  We also discussed cleaning schedules for her CPAP device.  I will see her back after her titration study is completed.    A total of 30 minutes of time was spent on this encounter today.  This includes reviewing patient's records, face-to-face time, documentation.       Problems Addressed this Visit    None  Visit Diagnoses     Obstructive sleep apnea    -  Primary    Relevant Orders    Polysomnography 4 or More Parameters With CPAP      Diagnoses       Codes Comments    Obstructive sleep apnea    -  Primary ICD-10-CM: G47.33  ICD-9-CM: 327.23

## 2022-12-29 ENCOUNTER — TELEPHONE (OUTPATIENT)
Dept: FAMILY MEDICINE CLINIC | Facility: CLINIC | Age: 72
End: 2022-12-29

## 2022-12-29 NOTE — TELEPHONE ENCOUNTER
Caller: Malissa Silva    Relationship: Self    Best call back number: [phone]    Caller requesting test results: PATIENT    What test was performed: BONE SCAN

## 2022-12-29 NOTE — TELEPHONE ENCOUNTER
Bone scan shows normal bone density.     Advised weight bearing exercises, calcium rich foods.     Repeat test in 2 years per recommendations.

## 2022-12-29 NOTE — TELEPHONE ENCOUNTER
Please advise what to tell pt about her DEXA scan done on 12/19/22 by [facility]. Report has been scanned and routed to you.

## 2023-01-05 ENCOUNTER — APPOINTMENT (OUTPATIENT)
Dept: WOMENS IMAGING | Facility: HOSPITAL | Age: 73
End: 2023-01-05
Payer: MEDICARE

## 2023-01-05 PROCEDURE — 77067 SCR MAMMO BI INCL CAD: CPT | Performed by: RADIOLOGY

## 2023-01-05 PROCEDURE — 77063 BREAST TOMOSYNTHESIS BI: CPT | Performed by: RADIOLOGY

## 2023-02-02 ENCOUNTER — HOSPITAL ENCOUNTER (OUTPATIENT)
Dept: SLEEP MEDICINE | Facility: HOSPITAL | Age: 73
Discharge: HOME | End: 2023-02-02
Admitting: PSYCHIATRY & NEUROLOGY
Payer: MEDICARE

## 2023-02-02 DIAGNOSIS — G47.33 OBSTRUCTIVE SLEEP APNEA: ICD-10-CM

## 2023-02-02 PROCEDURE — 95811 POLYSOM 6/>YRS CPAP 4/> PARM: CPT

## 2023-02-02 PROCEDURE — 95811 POLYSOM 6/>YRS CPAP 4/> PARM: CPT | Performed by: PSYCHIATRY & NEUROLOGY

## 2023-02-06 ENCOUNTER — TELEPHONE (OUTPATIENT)
Dept: FAMILY MEDICINE CLINIC | Facility: CLINIC | Age: 73
End: 2023-02-06

## 2023-02-06 DIAGNOSIS — H91.90 HEARING LOSS, UNSPECIFIED HEARING LOSS TYPE, UNSPECIFIED LATERALITY: Primary | ICD-10-CM

## 2023-02-06 NOTE — TELEPHONE ENCOUNTER
Caller: Malissa Silva    Relationship: Self    Best call back number:[phone] - AFTERNOON PLEASE     What is the medical concern/diagnosis: HEARING LOSS IN EAR ABOUT ONE MONTH     What specialty or service is being requested:  ENT     Any additional details:WANTS ASSAL TO RECOMMEND SOMEONE.

## 2023-02-24 ENCOUNTER — TELEPHONE (OUTPATIENT)
Dept: NEUROLOGY | Facility: CLINIC | Age: 73
End: 2023-02-24

## 2023-02-24 ENCOUNTER — TELEPHONE (OUTPATIENT)
Dept: FAMILY MEDICINE CLINIC | Facility: CLINIC | Age: 73
End: 2023-02-24

## 2023-02-24 NOTE — TELEPHONE ENCOUNTER
LM FOR PATIENT LETTING HER KNOW THAT HER RESULTS ARE STILL PENDING AND SHE WILL BE NOTIFIED VIA MYCHART BY DR PALMER WHEN RESULTS ARE COMPLETED    JUST AN FYI

## 2023-02-24 NOTE — TELEPHONE ENCOUNTER
Meds Buspar Trazadone are not helping and she has already had a sleep study and seen a sleep specialists she is only sleeping  4 hrs and very anxious

## 2023-02-24 NOTE — TELEPHONE ENCOUNTER
Caller: Ricardo Malissa POWELL    Relationship: Self    Best call back number: [phone]    What medications are you currently taking:   Current Outpatient Medications on File Prior to Visit   Medication Sig Dispense Refill   • atorvastatin (Lipitor) 20 MG tablet Take 1 tablet by mouth Daily. 90 tablet 0   • busPIRone (BUSPAR) 5 MG tablet Take 1 tablet by mouth At Night As Needed (anxiety). 30 tablet 0   • estradiol (CLIMARA) 0.0375 MG/24HR Place 1 patch on the skin as directed by provider 1 (One) Time Per Week.     • HYDROcodone-acetaminophen (NORCO) 5-325 MG per tablet Take 1 tablet by mouth Every 6 (Six) Hours As Needed for Moderate Pain. Gets 1 script per year 30 tablet 0   • traZODone (DESYREL) 150 MG tablet Take 1 tablet by mouth Every Night. 90 tablet 0     No current facility-administered medications on file prior to visit.          When did you start taking these medications: December 2022    Which medication are you concerned about: busPIRone (BUSPAR) 5 MG tablet    Who prescribed you this medication: ALLISON BISHOP    What are your concerns: PATIENT STATES THAT THIS MEDICATION REALLY DOESN'T HELP HER. HER SLEEPING IS GETTING WORSE AND SHE ONLY SLEEPS 4-5 HOURS A DAY. WOULD LIKE TO KNOW IF SHE'S ABLE TO GET SOMETHING ELSE PRESCRIBED

## 2023-02-24 NOTE — TELEPHONE ENCOUNTER
Please call and speak to patient . She has already tried everything we can offer.   Is the trazodone and buspar helping at all? Could increase dose if helping some. Another option would be to send to sleep psychologist . They are very helpful in treating sleep disorders. Let me know if interested.

## 2023-02-24 NOTE — TELEPHONE ENCOUNTER
"What does \"S/E\" stand for?  The raw data for her sleep study is scanned in, but this is not the results.  I first must read the study and generate a report.  I will contact the patient once I have completed this.    "

## 2023-02-24 NOTE — TELEPHONE ENCOUNTER
Pt does not want to see sleep pysc doctor this has been on-going issues was prescribed Ambien years ago and that worked

## 2023-02-24 NOTE — TELEPHONE ENCOUNTER
That is for the anxiety. If does not want to take SSRI, would recommend propranolol.   See if interested in sleep psychologist and will see if can send referral.

## 2023-02-24 NOTE — TELEPHONE ENCOUNTER
The sleep specialist she is seeing is only doing sleep study- I am recommending sleep psychologist- it is different.   Will she be willing to try an SSRI for the anxiety such as prozac, zoloft, etc ?

## 2023-02-24 NOTE — TELEPHONE ENCOUNTER
PT CALLED BACK , I SEEN REPORT IN MEDIA S/E GISEL AND WAS ADVISED  NEEDS TO REVIEW THE RESULTS FIRST THEN PT WILL BE CONTACT WITH RESULTS ON [employer]

## 2023-02-27 DIAGNOSIS — F41.9 ANXIETY: Primary | ICD-10-CM

## 2023-02-27 RX ORDER — PROPRANOLOL HYDROCHLORIDE 10 MG/1
10 TABLET ORAL 2 TIMES DAILY
Qty: 60 TABLET | Refills: 0 | Status: SHIPPED | OUTPATIENT
Start: 2023-02-27 | End: 2023-03-29 | Stop reason: SDUPTHER

## 2023-02-27 NOTE — TELEPHONE ENCOUNTER
I have sent. Start with 10 mg about 30 min to 1 hr before ready for bed. If does not make too drowsy can take another dose during the day for anxiety. Very low dose but will see how she does before increasing.

## 2023-02-28 ENCOUNTER — PATIENT MESSAGE (OUTPATIENT)
Dept: NEUROLOGY | Facility: CLINIC | Age: 73
End: 2023-02-28
Payer: MEDICARE

## 2023-03-02 NOTE — TELEPHONE ENCOUNTER
From: Juliane Mckeon  To: Malissa Silva  Sent: 2/28/2023 12:33 PM EST  Subject: sleep study    I just took a look at your sleep study. It doesn't look like you slept much. Did you have a bad night?

## 2023-03-14 DIAGNOSIS — E78.2 MIXED HYPERLIPIDEMIA: ICD-10-CM

## 2023-03-14 RX ORDER — ATORVASTATIN CALCIUM 20 MG/1
TABLET, FILM COATED ORAL
Qty: 90 TABLET | Refills: 0 | Status: SHIPPED | OUTPATIENT
Start: 2023-03-14

## 2023-03-29 DIAGNOSIS — F41.9 ANXIETY: ICD-10-CM

## 2023-03-29 RX ORDER — PROPRANOLOL HYDROCHLORIDE 10 MG/1
10 TABLET ORAL 2 TIMES DAILY
Qty: 60 TABLET | Refills: 0 | Status: SHIPPED | OUTPATIENT
Start: 2023-03-29

## 2023-04-04 ENCOUNTER — TELEPHONE (OUTPATIENT)
Dept: NEUROLOGY | Facility: CLINIC | Age: 73
End: 2023-04-04
Payer: MEDICARE

## 2023-04-29 DIAGNOSIS — F41.9 ANXIETY: ICD-10-CM

## 2023-05-01 RX ORDER — PROPRANOLOL HYDROCHLORIDE 10 MG/1
10 TABLET ORAL 2 TIMES DAILY
Qty: 60 TABLET | Refills: 0 | Status: SHIPPED | OUTPATIENT
Start: 2023-05-01

## 2023-06-07 DIAGNOSIS — M54.10 RADICULOPATHY OF LEG: ICD-10-CM

## 2023-06-07 RX ORDER — HYDROCODONE BITARTRATE AND ACETAMINOPHEN 5; 325 MG/1; MG/1
1 TABLET ORAL EVERY 6 HOURS PRN
Qty: 30 TABLET | Refills: 0 | OUTPATIENT
Start: 2023-06-07

## 2023-06-07 NOTE — TELEPHONE ENCOUNTER
Caller: Ricardo Malissa POWELL    Relationship: Self    Best call back number: [phone]     Requested Prescriptions:   Requested Prescriptions     Pending Prescriptions Disp Refills    HYDROcodone-acetaminophen (NORCO) 5-325 MG per tablet 30 tablet 0     Sig: Take 1 tablet by mouth Every 6 (Six) Hours As Needed for Moderate Pain. Gets 1 script per year        Pharmacy where request should be sent: [facility]/PHARMACY #[id number] - [place], [place] - [address] [place] [address] RD. AT [facility] [phone] [facility] [phone] FX     Last office visit with prescribing clinician: Visit date not found   Last telemedicine visit with prescribing clinician: Visit date not found   Next office visit with prescribing clinician: 7/11/2023     Additional details provided by patient: PATIENT HAS 2 LEFT    Does the patient have less than a 3 day supply:  [x] Yes  [] No    Would you like a call back once the refill request has been completed: [x] Yes [] No    If the office needs to give you a call back, can they leave a voicemail: [x] Yes [] No    Christian Mercado   06/07/23 15:38 EDT

## 2023-06-16 DIAGNOSIS — E78.2 MIXED HYPERLIPIDEMIA: ICD-10-CM

## 2023-06-16 RX ORDER — ATORVASTATIN CALCIUM 20 MG/1
TABLET, FILM COATED ORAL
Qty: 90 TABLET | Refills: 0 | Status: SHIPPED | OUTPATIENT
Start: 2023-06-16

## 2023-07-27 DIAGNOSIS — M54.10 RADICULOPATHY OF LEG: ICD-10-CM

## 2023-07-27 RX ORDER — HYDROCODONE BITARTRATE AND ACETAMINOPHEN 5; 325 MG/1; MG/1
1 TABLET ORAL EVERY 6 HOURS PRN
Qty: 30 TABLET | Refills: 0 | OUTPATIENT
Start: 2023-07-27

## 2023-07-31 ENCOUNTER — TELEPHONE (OUTPATIENT)
Dept: FAMILY MEDICINE CLINIC | Facility: CLINIC | Age: 73
End: 2023-07-31

## 2023-07-31 NOTE — TELEPHONE ENCOUNTER
Caller: Malissa Silva    Relationship: Self    Best call back number: [phone]    What is the best time to reach you: ANY    Who are you requesting to speak with (clinical staff, provider,  specific staff member): DAVID ANTHONY    Do you know the name of the person who called: PATIENT    What was the call regarding: SHE NEEDS TO KNOW WHY YOU DENIED HER HYDROCODONE.     Is it okay if the provider responds through MyChart: NO

## 2023-08-16 ENCOUNTER — OFFICE VISIT (OUTPATIENT)
Dept: FAMILY MEDICINE CLINIC | Facility: CLINIC | Age: 73
End: 2023-08-16
Payer: MEDICARE

## 2023-08-16 VITALS
BODY MASS INDEX: 32.76 KG/M2 | SYSTOLIC BLOOD PRESSURE: 122 MMHG | DIASTOLIC BLOOD PRESSURE: 60 MMHG | RESPIRATION RATE: 16 BRPM | HEIGHT: 62 IN | WEIGHT: 178 LBS | OXYGEN SATURATION: 96 % | HEART RATE: 75 BPM

## 2023-08-16 DIAGNOSIS — G25.81 RESTLESS LEG: ICD-10-CM

## 2023-08-16 DIAGNOSIS — M54.10 RADICULOPATHY OF LEG: ICD-10-CM

## 2023-08-16 DIAGNOSIS — F51.01 PRIMARY INSOMNIA: ICD-10-CM

## 2023-08-16 DIAGNOSIS — M25.511 ACUTE PAIN OF RIGHT SHOULDER: Primary | ICD-10-CM

## 2023-08-16 PROCEDURE — 99214 OFFICE O/P EST MOD 30 MIN: CPT | Performed by: NURSE PRACTITIONER

## 2023-08-16 RX ORDER — ZOLPIDEM TARTRATE 5 MG/1
5 TABLET ORAL NIGHTLY PRN
COMMUNITY
End: 2023-08-16 | Stop reason: SDUPTHER

## 2023-08-16 RX ORDER — HYDROCODONE BITARTRATE AND ACETAMINOPHEN 5; 325 MG/1; MG/1
1 TABLET ORAL EVERY 6 HOURS PRN
Qty: 30 TABLET | Refills: 0 | Status: CANCELLED | OUTPATIENT
Start: 2023-08-16

## 2023-08-16 RX ORDER — ZOLPIDEM TARTRATE 10 MG/1
5 TABLET ORAL NIGHTLY PRN
Qty: 30 TABLET | Refills: 1 | Status: CANCELLED | OUTPATIENT
Start: 2023-08-16

## 2023-08-16 RX ORDER — HYDROCODONE BITARTRATE AND ACETAMINOPHEN 5; 325 MG/1; MG/1
1 TABLET ORAL EVERY 6 HOURS PRN
Qty: 30 TABLET | Refills: 0 | Status: SHIPPED | OUTPATIENT
Start: 2023-08-16

## 2023-08-16 RX ORDER — ZOLPIDEM TARTRATE 5 MG/1
5 TABLET ORAL NIGHTLY PRN
Qty: 30 TABLET | Refills: 0 | Status: SHIPPED | OUTPATIENT
Start: 2023-08-16

## 2023-08-16 RX ORDER — PRAMIPEXOLE DIHYDROCHLORIDE 0.5 MG/1
0.25 TABLET ORAL NIGHTLY
Qty: 90 TABLET | Refills: 0 | Status: SHIPPED | OUTPATIENT
Start: 2023-08-16

## 2023-08-16 RX ORDER — PRAMIPEXOLE DIHYDROCHLORIDE 0.5 MG/1
0.25 TABLET ORAL NIGHTLY
Qty: 90 TABLET | Refills: 0 | Status: SHIPPED | OUTPATIENT
Start: 2023-08-16 | End: 2023-08-16 | Stop reason: SDUPTHER

## 2023-08-16 NOTE — PROGRESS NOTES
Subjective   Malissa Silva is a 73 y.o. female. Pt here today for c/o rt shoulder pain x5 months pt states she has a melanoma on arm she is having taken out today by Derm  MED EVAL REFILLS     History of Present Illness Insomnia, she alternates 3 hours one day and 9 hours the next in a cyclic pattern.  She has taken many OTC formulations    The following portions of the patient's history were reviewed and updated as appropriate: allergies, current medications, past family history, past medical history, past social history, past surgical history, and problem list.    Review of Systems   Constitutional: Negative.    HENT: Negative.     Eyes: Negative.    Respiratory: Negative.     Cardiovascular: Negative.    Gastrointestinal: Negative.    Endocrine: Negative.    Genitourinary: Negative.    Musculoskeletal:  Positive for arthralgias (rt shoulder).   Skin: Negative.    Allergic/Immunologic: Negative.    Neurological:  Positive for numbness (RLLS).   Hematological: Negative.    Psychiatric/Behavioral:  Positive for sleep disturbance.      Objective     Physical Exam  Constitutional:       Appearance: Normal appearance.   HENT:      Head: Normocephalic and atraumatic.      Nose: Nose normal.      Mouth/Throat:      Mouth: Mucous membranes are moist.   Eyes:      Extraocular Movements: Extraocular movements intact.      Pupils: Pupils are equal, round, and reactive to light.   Cardiovascular:      Rate and Rhythm: Normal rate and regular rhythm.      Pulses: Normal pulses.      Heart sounds: Normal heart sounds.   Pulmonary:      Effort: Pulmonary effort is normal.      Breath sounds: Normal breath sounds.   Abdominal:      General: Abdomen is flat.      Palpations: Abdomen is soft.   Musculoskeletal:         General: Normal range of motion.      Cervical back: Normal range of motion.   Skin:     General: Skin is warm and dry.      Capillary Refill: Capillary refill takes less than 2 seconds.   Neurological:       General: No focal deficit present.      Mental Status: She is alert.   Psychiatric:         Mood and Affect: Mood normal.       X-ray shoulder, right shows degenerative changes, no fractures.      Assessment & Plan   Diagnoses and all orders for this visit:    1. Acute pain of right shoulder (Primary)  -     Cancel: XR Shoulder 2+ View Right; Future  -     XR Shoulder 2+ View Right (In Office)  -     Ambulatory Referral to Physical Therapy    2. Restless leg  -     Discontinue: pramipexole (Mirapex) 0.5 MG tablet; Take 0.5 tablets by mouth Every Night.  Dispense: 90 tablet; Refill: 0  -     Discontinue: pramipexole (Mirapex) 0.5 MG tablet; Take 0.5 tablets by mouth Every Night.  Dispense: 90 tablet; Refill: 0  -     Ambulatory Referral to Physical Therapy    3. Radiculopathy of leg  -     Ambulatory Referral to Physical Therapy    4. Primary insomnia           Discussed Care Gaps, ordered referrals and encouraged vaccination updates.       - Pt agrees with plan of care and denies further questions/concerns today  - This document is intended for medical expert use only. Persons  reading this document without medical staff guidance may result in misinterpretation and unintended morbidity     Go to the ER for any possible life-threatening symptoms such as chest pain or shortness of air.      Please allow 3-5 business days for recommendations based on new results      I personally spent time with this patient, preparing for the visit, reviewing tests, obtaining and/or reviewing a separately obtained history, performing a medically appropriate examination and/or evaluation, counseling and educating the patient/family/caregiver, ordering medications,  documenting information in the medical record and indepentently interpreting results.        No follow-ups on file.

## 2023-08-23 ENCOUNTER — TELEPHONE (OUTPATIENT)
Dept: FAMILY MEDICINE CLINIC | Facility: CLINIC | Age: 73
End: 2023-08-23

## 2023-08-23 DIAGNOSIS — F51.01 PRIMARY INSOMNIA: Primary | ICD-10-CM

## 2023-08-23 RX ORDER — ZOLPIDEM TARTRATE 10 MG/1
5 TABLET ORAL NIGHTLY PRN
Qty: 30 TABLET | Refills: 5 | Status: SHIPPED | OUTPATIENT
Start: 2023-08-23

## 2023-08-23 NOTE — TELEPHONE ENCOUNTER
Hub staff attempted to follow warm transfer process and was unsuccessful     Caller: Malissa Silva    Relationship to patient: Self    Best call back number: [phone]     Patient is needing: PATIENT IS CALLING TO STATE THE FOLLOWING MEDICATION HAS RED DYE IN IT AND SHE IS ALLERGIC TO RED DYE.  SHE STATES THE PHARMACIST TOLD HER THE 10 MG DOSAGE DOES NOT HAVE RED DYE IN IT.    zolpidem (Ambien) 5 MG tablet     [facility]/pharmacy #[id number] - [place], [place] - [address] [place] [address] RD. AT [facility] - [phone]  - [phone]  [phone]     PLEASE ADVISE.

## 2023-10-26 DIAGNOSIS — R25.1 TREMORS OF NERVOUS SYSTEM: ICD-10-CM

## 2023-10-26 RX ORDER — PROPRANOLOL HYDROCHLORIDE 20 MG/1
20 TABLET ORAL DAILY
Qty: 90 TABLET | Refills: 1 | Status: SHIPPED | OUTPATIENT
Start: 2023-10-26

## 2023-10-30 ENCOUNTER — OFFICE VISIT (OUTPATIENT)
Dept: SLEEP MEDICINE | Facility: HOSPITAL | Age: 73
End: 2023-10-30
Payer: MEDICARE

## 2023-10-30 VITALS
WEIGHT: 178 LBS | DIASTOLIC BLOOD PRESSURE: 76 MMHG | HEIGHT: 62 IN | OXYGEN SATURATION: 98 % | HEART RATE: 67 BPM | SYSTOLIC BLOOD PRESSURE: 152 MMHG | BODY MASS INDEX: 32.76 KG/M2

## 2023-10-30 DIAGNOSIS — G25.81 RESTLESS LEG: ICD-10-CM

## 2023-10-30 PROCEDURE — G0463 HOSPITAL OUTPT CLINIC VISIT: HCPCS

## 2023-10-30 RX ORDER — PRAMIPEXOLE DIHYDROCHLORIDE 0.25 MG/1
0.25 TABLET ORAL NIGHTLY
Qty: 30 TABLET | Refills: 5 | Status: SHIPPED | OUTPATIENT
Start: 2023-10-30

## 2023-10-30 NOTE — PROGRESS NOTES
[facility]                          Chief Complaint:   F/up LALO, RLS    History of present illness:   Subjective     LALO:   HST 20: YONATHAN=10/h; SpO2=84%; Hypoxic burden=2 min; JACQUE= 10/h.   PAP titration 2/3/2023: CPAP 8 reached. AHI=0.    She uses her CPAP regularly.  Unfortunately she did not bring smart card today (she stated that she does not have one) and it does not seem that she is connected to [employer].  However, we have obtained a download on her from July of this year and I have reviewed and summarized below.    Mask: NP which does fit well.  No significant air leak or dry mouth.  DME: [facility]      Sleep schedule:  -Bedtime: 1:30 AM  -Wake up time: 10:30 AM , does feel refreshed  -Nocturnal awakenin times because of noise.  No difficulties going back to sleep.    ESS: Total score: 1     RLS:  On prior visit, she describes symptoms of painful RLS usually in the morning when she is about to fall asleep and at that time her sleep schedule was shifted toward early morning.  She previously has improved with oxycodone/hydrocodone.  However we will prescribe her Mirapex 0.5 mg on the last visit and she is currently taking it regularly.  She reported that it works really good for her but sometimes she experiences dizziness when she stands up.    REVIEW OF SYSTEMS:   HEENT: No nasal congestion or postnasal drip   CARDIOVASCULAR: No chest pain, chest pressure or chest discomfort. No palpitations or edema.   RESPIRATORY: No shortness of breath, cough or sputum.   GASTROINTESTINAL: No abdominal bloating or reflux   NEUROLOGICAL/PSYCHOATRY: No depression nor anxiety    Past Medical History:  Past Medical History:   Diagnosis Date    Anesthesia complication     STATES SLOW TO WAKE UP    Bursitis     Depression     approx     Insomnia     Mild sleep apnea     Osteoarthritis     Slow to wake up after anesthesia    ,   Past Surgical History:   Procedure Laterality Date    COLONOSCOPY   2011    Dr. Walsh;     COLONOSCOPY N/A 03/29/2021    Procedure: COLONOSCOPY TO CECUM ;  Surgeon: Hermelinda Walsh MD;  Location: [facility] ENDOSCOPY;  Service: General;  Laterality: N/A;  POSITIVE COLOGUARD  --DIVERTICULOSIS, ANAL PAPILLAE     HYSTERECTOMY  1997    JOINT REPLACEMENT  2015 2018    TOTAL HIP ARTHROPLASTY Right 06/05/2015    hardeep hips   ,   Family History   Problem Relation Age of Onset    Stroke Mother     Deep vein thrombosis Father     Hypertension Father     Diabetes Maternal Grandmother     Stomach cancer Paternal Grandmother     Cancer Sister     Malig Hyperthermia Neg Hx    ,   Social History     Socioeconomic History    Marital status:    Tobacco Use    Smoking status: Never    Smokeless tobacco: Never   Vaping Use    Vaping Use: Never used   Substance and Sexual Activity    Alcohol use: Yes     Comment: Occasional social drink once a month    Drug use: No    Sexual activity: Not Currently     Partners: Male     E-cigarette/Vaping    E-cigarette/Vaping Use Never User     Passive Exposure No     Counseling Given No      E-cigarette/Vaping Substances    Nicotine No     THC No     CBD No     Flavoring No      E-cigarette/Vaping Devices    Disposable No     Pre-filled or Refillable Cartridge No     Refillable Tank No     Pre-filled Pod No          , and Allergies:  Chlorine, Penicillins, Atorvastatin, Citrullus vulgaris, Daucus carota, Epinephrine, Azithromycin, Banana, Cantaloupe (diagnostic), Chocolate, Cinnamon, Clindamycin, Iodinated contrast media, Pineapple, Red dye, Soybean oil, Tetanus immune globulin, and Tylenol pm extra [diphenhydramine-apap (sleep)]    Medication Review:     Current Outpatient Medications:     estradiol (CLIMARA) 0.0375 MG/24HR, Place 1 patch on the skin as directed by provider 1 (One) Time Per Week., Disp: , Rfl:     HYDROcodone-acetaminophen (NORCO) 5-325 MG per tablet, Take 1 tablet by mouth Every 6 (Six) Hours As Needed for Moderate Pain. Gets 1 script per  "year, Disp: 30 tablet, Rfl: 0    pramipexole (Mirapex) 0.5 MG tablet, Take 0.5 tablets by mouth Every Night., Disp: 90 tablet, Rfl: 0    propranolol (INDERAL) 20 MG tablet, TAKE 1 TABLET BY MOUTH EVERY DAY, Disp: 90 tablet, Rfl: 1    zolpidem (AMBIEN) 10 MG tablet, Take 0.5 tablets by mouth At Night As Needed for Sleep., Disp: 30 tablet, Rfl: 5      Objective   Vital Signs:  Vitals:    10/30/23 1456   BP: 152/76   Pulse: 67   SpO2: 98%   Weight: 80.7 kg (178 lb)   Height: 157.5 cm (62\")     Body mass index is 32.56 kg/m².          Physical Exam:   General Appearance:    Alert, cooperative, in no acute distress   ENMT:  Freidman score 3, narrow distance in between the posterior pharyngeal pillars    Neck:  Trachea midline. No thyromegaly.   Lungs:     Clear to auscultation,respirations regular, even and  unlabored    Heart:    Regular rhythm and normal rate, normal S1 and S2, no Murmur.   Abdomen:       Soft.  No tenderness.  No HSM    Lymphatics: No palpable cervical or supraclavicular lymph nodes   Integumentary: No rash or ecchymosis   Neuro:   Conscious, alert, oriented x3. Appropriate mood and affect.    Extremities:   Moves all extremities well, no edema, no cyanosis, no Redness              Diagnostic data:      CPAP download showed:  Date: 4/6/2023 - 7/4/2023.  Usage (days): 94%  Days used>4h: 74 %  AHI: 0.6/h  Leak: 1 min   Usage: 6 h and 5 min  P 90% : 6.9  Auto CPAP: 4-16 cm H2O    Lab Results   Component Value Date    HGBA1C 5.5 12/08/2022     Triglycerides   Date Value Ref Range Status   07/13/2023 261 (H) 0 - 149 mg/dL Final     HDL Cholesterol   Date Value Ref Range Status   07/13/2023 49 >39 mg/dL Final     Hemoglobin   Date Value Ref Range Status   07/13/2023 12.3 11.1 - 15.9 g/dL Final   09/18/2018 10.8 (L) 12.0 - 16.0 g/dL Final     Total CO2   Date Value Ref Range Status   07/13/2023 23 20 - 29 mmol/L Final   09/18/2018 22 22 - 30 mmol/L Final        Assessment "   LALO  RLS  Anxiety/depression        PLAN:  Discussed the result of the download.   Patient is compliant with therapy and clinically benefit from treatment.  Patient was encouraged to continue using PAP.  Refill supplies.    Lower the dose of Mirapex due to dizziness.  We will send a prescription of Mirapex 0.25 mg nightly.  Prescription sent with 5 refills.    Seems like she has been taken off the SSRI which has probably improved her RLS symptoms as well              This note was dictated utilizing [employer] dictation

## 2024-01-03 ENCOUNTER — TELEPHONE (OUTPATIENT)
Dept: FAMILY MEDICINE CLINIC | Facility: CLINIC | Age: 74
End: 2024-01-03
Payer: MEDICARE

## 2024-01-03 DIAGNOSIS — F51.01 PRIMARY INSOMNIA: ICD-10-CM

## 2024-01-03 DIAGNOSIS — U07.1 COVID-19: Primary | ICD-10-CM

## 2024-01-03 RX ORDER — ALBUTEROL SULFATE 90 UG/1
2 AEROSOL, METERED RESPIRATORY (INHALATION) EVERY 4 HOURS PRN
Qty: 6.7 G | Refills: 12 | Status: SHIPPED | OUTPATIENT
Start: 2024-01-03

## 2024-01-03 RX ORDER — DEXTROMETHORPHAN HYDROBROMIDE AND PROMETHAZINE HYDROCHLORIDE 15; 6.25 MG/5ML; MG/5ML
5 SYRUP ORAL
Qty: 180 ML | Refills: 0 | Status: SHIPPED | OUTPATIENT
Start: 2024-01-03

## 2024-01-03 RX ORDER — GUAIFENESIN 600 MG/1
1200 TABLET, EXTENDED RELEASE ORAL 2 TIMES DAILY
Qty: 40 TABLET | Refills: 0 | Status: SHIPPED | OUTPATIENT
Start: 2024-01-03 | End: 2024-01-13

## 2024-01-03 NOTE — TELEPHONE ENCOUNTER
Caller: Malissa Silva    Relationship to patient: Self    Best call back number: [phone]     Date of positive COVID19 test: 1/3/2024    Date of possible COVID19 exposure: UNKNOWN    COVID19 symptoms: PRODUCTIVE COUGH, FATIGUE    Date of initial quarantine: 12/28/2024    What is the patients preferred pharmacy: [facility] [id number] [place] [address] [address] AT [facility] [address] [facility] [phone] [facility] [phone] FX

## 2024-01-03 NOTE — TELEPHONE ENCOUNTER
Caller: Ricardo Malissa POWELL    Relationship: Self    Best call back number: [phone]     Requested Prescriptions:   Requested Prescriptions     Pending Prescriptions Disp Refills    zolpidem (AMBIEN) 10 MG tablet 30 tablet 5     Sig: Take 0.5 tablets by mouth At Night As Needed for Sleep.        Pharmacy where request should be sent: [facility] #[id number] [place] [address] [address] AT [facility] [address] & [facility] [phone] [facility] [phone]      Last office visit with prescribing clinician: 8/16/2023   Last telemedicine visit with prescribing clinician: Visit date not found   Next office visit with prescribing clinician: Visit date not found     Additional details provided by patient: THE PREVIOUS PHARMACY WAS UNABLE TO FILL HER PRESCRIPTION, SO THE PATIENT IS USING A NEW PHARMACY AND MAY NEED THE PRESCRIPTION RE-WRITTEN.    PATIENT ADDS THAT SHE IS ALLERGIC TO DYE AND THE MEDICATION MUST BE WHITE.    THE PATIENT RUNS OUT OF MEDICATION TONIGHT.    Does the patient have less than a 3 day supply:  [x] Yes  [] No    Christian Sommer Rep   01/03/24 14:54 EST

## 2024-01-04 RX ORDER — ZOLPIDEM TARTRATE 10 MG/1
5 TABLET ORAL NIGHTLY PRN
Qty: 30 TABLET | Refills: 5 | Status: SHIPPED | OUTPATIENT
Start: 2024-01-04

## 2024-01-04 NOTE — TELEPHONE ENCOUNTER
Left detailed message on VM per HIPAA form.    Pt called back and said she just started with s/s on Tuesday. She was just letting you know she hadn't been out since 12/28/23. Can you send Paxlovid please?

## 2024-02-05 ENCOUNTER — APPOINTMENT (OUTPATIENT)
Dept: WOMENS IMAGING | Facility: HOSPITAL | Age: 74
End: 2024-02-05
Payer: MEDICARE

## 2024-02-05 PROCEDURE — 77063 BREAST TOMOSYNTHESIS BI: CPT | Performed by: RADIOLOGY

## 2024-02-05 PROCEDURE — 77067 SCR MAMMO BI INCL CAD: CPT | Performed by: RADIOLOGY

## 2024-03-11 ENCOUNTER — TELEPHONE (OUTPATIENT)
Dept: FAMILY MEDICINE CLINIC | Facility: CLINIC | Age: 74
End: 2024-03-11

## 2024-03-11 NOTE — TELEPHONE ENCOUNTER
Caller: Malissa Silva    Relationship: Self    Best call back number: [phone]    What is the best time to reach you: ANYTIME    Who are you requesting to speak with (clinical staff, provider,  specific staff member): CLINICAL    What was the call regarding: PATIENT SATED THAT SHE HAD COVID ON 1/2/24 AND A WEEK LATER SHE WENT TO URGENT CARE AND THEY SAID SHE HAD BRONCHITIS AND PRESCRIBED HER AN ANTIBIOTIC AND STEROID AND SHE FELT GOOD FOR 3-4 DAYS . PATIENT STATED THAT SHE STILL HAS IT AND SHE IS COUGHING UP STUFF ALL THE TIME,CAN'T SLEEP AND COUGHS ALL DAY. PATIENT IS REQUESTING TO KNOW WHAT ALLISON ANTHONY RECOMMENDS. PATIENT REQUESTING CALLBACK WITH UPDATES.    PREFERRED PHARMACY:  [facility] #[id number] [facility] [address] [facility] [address]  - [phone] [facility] [phone]  [phone]

## 2024-03-12 DIAGNOSIS — U09.9 POST-COVID CHRONIC COUGH: Primary | ICD-10-CM

## 2024-03-12 DIAGNOSIS — R05.3 POST-COVID CHRONIC COUGH: Primary | ICD-10-CM

## 2024-03-12 RX ORDER — DEXTROMETHORPHAN HYDROBROMIDE AND PROMETHAZINE HYDROCHLORIDE 15; 6.25 MG/5ML; MG/5ML
5 SYRUP ORAL
Qty: 180 ML | Refills: 0 | Status: SHIPPED | OUTPATIENT
Start: 2024-03-12

## 2024-03-12 RX ORDER — BENZONATATE 100 MG/1
200 CAPSULE ORAL 2 TIMES DAILY PRN
Qty: 20 CAPSULE | Refills: 1 | Status: SHIPPED | OUTPATIENT
Start: 2024-03-12 | End: 2024-03-22

## 2024-03-12 NOTE — TELEPHONE ENCOUNTER
Please call her and tell her that I am giving her tessalon perles for the daytime and she can take the promethazine with dextromethorphan at bedtime.  She is likely to have this cough for 6 weeks or longer due to Covid.

## 2024-03-25 ENCOUNTER — TELEPHONE (OUTPATIENT)
Dept: SLEEP MEDICINE | Facility: HOSPITAL | Age: 74
End: 2024-03-25
Payer: MEDICARE

## 2024-03-25 NOTE — TELEPHONE ENCOUNTER
Pt is wanting to get a new machine & has spoken to [facility]. Pt would like to use [facility]. Faxed notes to [facility] and will get a new prescription from [facility] and send separately. Pt prefers [facility].

## 2024-03-26 ENCOUNTER — TELEPHONE (OUTPATIENT)
Dept: SLEEP MEDICINE | Facility: HOSPITAL | Age: 74
End: 2024-03-26
Payer: MEDICARE

## 2024-03-29 ENCOUNTER — OFFICE VISIT (OUTPATIENT)
Dept: FAMILY MEDICINE CLINIC | Facility: CLINIC | Age: 74
End: 2024-03-29
Payer: MEDICARE

## 2024-03-29 VITALS
WEIGHT: 170.2 LBS | HEART RATE: 67 BPM | DIASTOLIC BLOOD PRESSURE: 66 MMHG | OXYGEN SATURATION: 96 % | HEIGHT: 63 IN | BODY MASS INDEX: 30.16 KG/M2 | SYSTOLIC BLOOD PRESSURE: 102 MMHG

## 2024-03-29 DIAGNOSIS — H69.92 DYSFUNCTION OF LEFT EUSTACHIAN TUBE: ICD-10-CM

## 2024-03-29 DIAGNOSIS — J40 BRONCHITIS: Primary | ICD-10-CM

## 2024-03-29 PROCEDURE — 1160F RVW MEDS BY RX/DR IN RCRD: CPT | Performed by: NURSE PRACTITIONER

## 2024-03-29 PROCEDURE — 1159F MED LIST DOCD IN RCRD: CPT | Performed by: NURSE PRACTITIONER

## 2024-03-29 PROCEDURE — 99213 OFFICE O/P EST LOW 20 MIN: CPT | Performed by: NURSE PRACTITIONER

## 2024-03-29 RX ORDER — FLUTICASONE PROPIONATE 50 MCG
2 SPRAY, SUSPENSION (ML) NASAL DAILY
Qty: 9.9 ML | Refills: 3 | Status: SHIPPED | OUTPATIENT
Start: 2024-03-29

## 2024-03-29 RX ORDER — DOXYCYCLINE HYCLATE 100 MG/1
100 CAPSULE ORAL 2 TIMES DAILY
Qty: 14 CAPSULE | Refills: 0 | Status: SHIPPED | OUTPATIENT
Start: 2024-03-29

## 2024-03-29 NOTE — PROGRESS NOTES
Answers submitted by the patient for this visit:  Primary Reason for Visit (Submitted on 3/28/2024)  What is the primary reason for your visit?: Cough  Cough Questionnaire (Submitted on 3/28/2024)  Chief Complaint: Cough  Chronicity: chronic  Onset: more than 1 month ago  Progression since onset: unchanged  Frequency: intermittent  Cough characteristics: dry, productive of clear sputum, productive of yellow sputum  chest pain: Yes  chills: Yes  myalgias: Yes  sore throat: Yes  Aggravated by: nothing  Subjective   Malissa Silva is a 73 y.o. female. Presents today for   Chief Complaint   Patient presents with    Cough     X 3 months    Hoarse    Fatigue    Generalized Body Aches    Nausea    Ear Fullness       History Of Present Illness    Malissa reports that she has had a cough x 3 months, since she had Covid the end of December.  She is worn out and has nausea a lot.  Her ears feel full as well.  Pertinent negative, cough not keeping her awake.      Patient Active Problem List   Diagnosis    Arthritis of hand    Coxitis    Muscle strain of chest wall    Chronic depression    Fatigue    Insomnia    Obstructive sleep apnea syndrome    Antinuclear factor positive    Restless legs syndrome    Scapulocostal syndrome    Increased frequency of urination    Status post total replacement of right hip    Encounter for monitoring primary estrogen replacement therapy    Radiculopathy of leg    Lateral femoral cutaneous neuropathy, right    Left hip pain    Primary osteoarthritis of left hip    Lipid screening    Medication monitoring encounter    Somatic dysfunction    Injury to fingernail of right hand    Positive colorectal cancer screening using DNA-based stool test    Left hip pain       Social History     Socioeconomic History    Marital status:    Tobacco Use    Smoking status: Never    Smokeless tobacco: Never   Vaping Use    Vaping status: Never Used   Substance and Sexual Activity    Alcohol use: Yes      Comment: Occasional social drink once a month    Drug use: No    Sexual activity: Not Currently     Partners: Male       Allergies   Allergen Reactions    Chlorine Shortness Of Breath    Citrullus Vulgaris Other (See Comments)     Per allergy testing    Daucus Carota Itching    Epinephrine Dizziness     Syncope immediately after dental injection    Atorvastatin Irritability     Caused severe agitation    Penicillins Hives    Azithromycin Rash    Banana Palpitations and Rash     Per allergy testing    Cantaloupe (Diagnostic) Palpitations and Rash     Per allergy testing    Chocolate Palpitations and Rash     Per allergy testing    Cinnamon Palpitations and Rash     Per allergy testing    Clindamycin Rash    Iodinated Contrast Media Other (See Comments)     Refuses them as afraid of reaction as she thinks this has red dye, has never received iodine contrast injection    Pineapple Palpitations and Rash     Per allergy testing    Red Dye Rash and Mental Status Change     Allergic to any meds w/ red, pink, lavender, orange dye.    Soybean Oil Itching    Tetanus Immune Globulin Other (See Comments)     fever    Tylenol Pm Extra [Diphenhydramine-Apap (Sleep)] Irritability       Current Outpatient Medications on File Prior to Visit   Medication Sig Dispense Refill    albuterol sulfate  (90 Base) MCG/ACT inhaler Inhale 2 puffs Every 4 (Four) Hours As Needed for Wheezing or Shortness of Air (cough, wheezing or shortness of breath). 6.7 g 12    estradiol (CLIMARA) 0.0375 MG/24HR Place 1 patch on the skin as directed by provider 1 (One) Time Per Week.      HYDROcodone-acetaminophen (NORCO) 5-325 MG per tablet Take 1 tablet by mouth Every 6 (Six) Hours As Needed for Moderate Pain. Gets 1 script per year 30 tablet 0    pramipexole (Mirapex) 0.25 MG tablet Take 1 tablet by mouth Every Night. 30 tablet 5    zolpidem (AMBIEN) 10 MG tablet Take 0.5 tablets by mouth At Night As Needed for Sleep. 30 tablet 5     "promethazine-dextromethorphan (PROMETHAZINE-DM) 6.25-15 MG/5ML syrup Take 5 mL by mouth every night at bedtime. (Patient not taking: Reported on 3/29/2024) 180 mL 0     No current facility-administered medications on file prior to visit.       Objective   Vitals:    03/29/24 1331   BP: 102/66   Pulse: 67   SpO2: 96%   Weight: 77.2 kg (170 lb 3.2 oz)   Height: 160 cm (63\")     Body mass index is 30.15 kg/m².    Physical Exam  Constitutional:       Appearance: She is obese.   HENT:      Head: Normocephalic and atraumatic.      Ears:      Comments: Left TM has clear fluid behind it.  No infection noted.  Suspect eustachian tube dysfunction.     Mouth/Throat:      Mouth: Mucous membranes are moist.   Eyes:      Pupils: Pupils are equal, round, and reactive to light.   Cardiovascular:      Rate and Rhythm: Normal rate and regular rhythm.      Pulses: Normal pulses.      Heart sounds: Normal heart sounds.   Pulmonary:      Effort: Pulmonary effort is normal.      Breath sounds: Wheezing present.   Abdominal:      General: Bowel sounds are normal.   Musculoskeletal:         General: Normal range of motion.   Skin:     General: Skin is warm and dry.      Capillary Refill: Capillary refill takes less than 2 seconds.   Neurological:      General: No focal deficit present.      Mental Status: She is alert.   Psychiatric:         Mood and Affect: Mood normal.     Procedures     Assessment & Plan   Diagnoses and all orders for this visit:    1. Bronchitis (Primary)  -     doxycycline (VIBRAMYCIN) 100 MG capsule; Take 1 capsule by mouth 2 (Two) Times a Day.  Dispense: 14 capsule; Refill: 0    2. Dysfunction of left eustachian tube  -     fluticasone (FLONASE) 50 MCG/ACT nasal spray; 2 sprays into the nostril(s) as directed by provider Daily.  Dispense: 9.9 mL; Refill: 3            Body mass index is 30.15 kg/m².    Discussed Care Gaps, ordered referrals and encouraged vaccination updates.       - Pt agrees with plan of care and " denies further questions/concerns today  - This document is intended for medical expert use only. Persons  reading this document without medical staff guidance may result in misinterpretation and unintended morbidity     Go to the ER for any possible life-threatening symptoms such as chest pain or shortness of air.      Please allow 3-5 business days for recommendations based on new results      I personally spent time with this patient, preparing for the visit, reviewing tests, obtaining and/or reviewing a separately obtained history, performing a medically appropriate examination and/or evaluation, counseling and educating the patient/family/caregiver, ordering medications,  documenting information in the medical record and indepentently interpreting results.               Return in about 6 months (around 9/29/2024) for Annual physical.

## 2024-03-29 NOTE — PROGRESS NOTES
"Chief Complaint  Cough (X 3 months), Hoarse, Fatigue, Generalized Body Aches, Nausea, and Ear Fullness    Subjective        Malissa Silva presents to [facility]  History of Present Illness    Objective   Vital Signs:  /66   Pulse 67   Ht 160 cm (63\")   Wt 77.2 kg (170 lb 3.2 oz)   SpO2 96%   BMI 30.15 kg/m²   Estimated body mass index is 30.15 kg/m² as calculated from the following:    Height as of this encounter: 160 cm (63\").    Weight as of this encounter: 77.2 kg (170 lb 3.2 oz).               Physical Exam   Result Review :                     Assessment and Plan     There are no diagnoses linked to this encounter.         Follow Up     No follow-ups on file.  Patient was given instructions and counseling regarding her condition or for health maintenance advice. Please see specific information pulled into the AVS if appropriate.         "

## 2024-05-24 DIAGNOSIS — G25.81 RESTLESS LEG: ICD-10-CM

## 2024-05-24 RX ORDER — PRAMIPEXOLE DIHYDROCHLORIDE 0.25 MG/1
0.25 TABLET ORAL NIGHTLY
Qty: 30 TABLET | Refills: 4 | Status: SHIPPED | OUTPATIENT
Start: 2024-05-24

## 2024-07-17 ENCOUNTER — OFFICE VISIT (OUTPATIENT)
Dept: FAMILY MEDICINE CLINIC | Facility: CLINIC | Age: 74
End: 2024-07-17
Payer: MEDICARE

## 2024-07-17 VITALS
HEART RATE: 57 BPM | SYSTOLIC BLOOD PRESSURE: 140 MMHG | BODY MASS INDEX: 29.52 KG/M2 | OXYGEN SATURATION: 96 % | HEIGHT: 63 IN | DIASTOLIC BLOOD PRESSURE: 78 MMHG | WEIGHT: 166.6 LBS

## 2024-07-17 DIAGNOSIS — F51.01 PRIMARY INSOMNIA: ICD-10-CM

## 2024-07-17 DIAGNOSIS — K21.00 GASTROESOPHAGEAL REFLUX DISEASE WITH ESOPHAGITIS WITHOUT HEMORRHAGE: Primary | ICD-10-CM

## 2024-07-17 DIAGNOSIS — E78.5 DYSLIPIDEMIA: ICD-10-CM

## 2024-07-17 DIAGNOSIS — M54.10 RADICULOPATHY OF LEG: ICD-10-CM

## 2024-07-17 PROCEDURE — 1170F FXNL STATUS ASSESSED: CPT | Performed by: NURSE PRACTITIONER

## 2024-07-17 PROCEDURE — G0439 PPPS, SUBSEQ VISIT: HCPCS | Performed by: NURSE PRACTITIONER

## 2024-07-17 PROCEDURE — 1125F AMNT PAIN NOTED PAIN PRSNT: CPT | Performed by: NURSE PRACTITIONER

## 2024-07-17 RX ORDER — FAMOTIDINE 40 MG/1
20 TABLET, FILM COATED ORAL DAILY
Qty: 30 TABLET | Refills: 5 | Status: SHIPPED | OUTPATIENT
Start: 2024-07-17

## 2024-07-17 RX ORDER — HYDROCODONE BITARTRATE AND ACETAMINOPHEN 5; 325 MG/1; MG/1
1 TABLET ORAL EVERY 6 HOURS PRN
Qty: 30 TABLET | Refills: 0 | Status: SHIPPED | OUTPATIENT
Start: 2024-07-17

## 2024-07-17 RX ORDER — ZOLPIDEM TARTRATE 10 MG/1
5 TABLET ORAL NIGHTLY PRN
Qty: 30 TABLET | Refills: 5 | Status: SHIPPED | OUTPATIENT
Start: 2024-07-17

## 2024-07-17 RX ORDER — PANTOPRAZOLE SODIUM 20 MG/1
20 TABLET, DELAYED RELEASE ORAL DAILY
Qty: 90 TABLET | Refills: 3 | Status: SHIPPED | OUTPATIENT
Start: 2024-07-17 | End: 2024-07-17

## 2024-07-17 NOTE — PROGRESS NOTES
Subjective   The ABCs of the Annual Wellness Visit  Medicare Wellness Visit      Malissa Silva is a 74 y.o. patient who presents for a Medicare Wellness Visit.    The following portions of the patient's history were reviewed and   updated as appropriate: allergies, current medications, past family history, past medical history, past social history, past surgical history, and problem list.    Compared to one year ago, the patient's physical   health is the same.  Compared to one year ago, the patient's mental   health is the same.    Recent Hospitalizations:  She was not admitted to the hospital during the last year.     Current Medical Providers:  Patient Care Team:  Camila Frey APRN as PCP - General (Family Medicine)    Outpatient Medications Prior to Visit   Medication Sig Dispense Refill    albuterol sulfate  (90 Base) MCG/ACT inhaler Inhale 2 puffs Every 4 (Four) Hours As Needed for Wheezing or Shortness of Air (cough, wheezing or shortness of breath). 6.7 g 12    doxycycline (VIBRAMYCIN) 100 MG capsule Take 1 capsule by mouth 2 (Two) Times a Day. 14 capsule 0    estradiol (CLIMARA) 0.0375 MG/24HR Place 1 patch on the skin as directed by provider 1 (One) Time Per Week.      fluticasone (FLONASE) 50 MCG/ACT nasal spray 2 sprays into the nostril(s) as directed by provider Daily. 9.9 mL 3    HYDROcodone-acetaminophen (NORCO) 5-325 MG per tablet Take 1 tablet by mouth Every 6 (Six) Hours As Needed for Moderate Pain. Gets 1 script per year 30 tablet 0    pramipexole (Mirapex) 0.25 MG tablet Take 1 tablet by mouth Every Night. 30 tablet 4    promethazine-dextromethorphan (PROMETHAZINE-DM) 6.25-15 MG/5ML syrup Take 5 mL by mouth every night at bedtime. (Patient not taking: Reported on 3/29/2024) 180 mL 0    zolpidem (AMBIEN) 10 MG tablet Take 0.5 tablets by mouth At Night As Needed for Sleep. 30 tablet 5     No facility-administered medications prior to visit.     Opioid medication/s are on active  "medication list.  and I have evaluated her active treatment plan and pain score trends (see table).  There were no vitals filed for this visit.  I have reviewed the chart for potential of high risk medication and harmful drug interactions in the elderly.        Aspirin is not on active medication list.  Aspirin use is indicated based on review of current medical condition/s. Pros and cons of this therapy have been discussed with this patient. Benefits of this medication outweigh potential harm.  Patient has been instructed to start taking this medication..    Patient Active Problem List   Diagnosis    Arthritis of hand    Coxitis    Muscle strain of chest wall    Chronic depression    Fatigue    Insomnia    Obstructive sleep apnea syndrome    Antinuclear factor positive    Restless legs syndrome    Scapulocostal syndrome    Increased frequency of urination    Status post total replacement of right hip    Encounter for monitoring primary estrogen replacement therapy    Radiculopathy of leg    Lateral femoral cutaneous neuropathy, right    Left hip pain    Primary osteoarthritis of left hip    Lipid screening    Medication monitoring encounter    Somatic dysfunction    Injury to fingernail of right hand    Positive colorectal cancer screening using DNA-based stool test    Left hip pain     Advance Care Planning (Click this link to access ACP Navigator)  Advance Directive is not on file.  ACP discussion was declined by the patient. Patient does not have an advance directive, information provided.        Objective   There were no vitals filed for this visit.    Estimated body mass index is 30.15 kg/m² as calculated from the following:    Height as of 3/29/24: 160 cm (63\").    Weight as of 3/29/24: 77.2 kg (170 lb 3.2 oz).    BMI is >= 30 and <35. (Class 1 Obesity). The following options were offered after discussion;: weight loss educational material (shared in after visit summary), exercise counseling/recommendations, " nutrition counseling/recommendations, and pharmacological intervention options        Does the patient have evidence of cognitive impairment? No                                                                                                Health  Risk Assessment    Smoking Status:  Social History     Tobacco Use   Smoking Status Never   Smokeless Tobacco Never     Alcohol Consumption:  Social History     Substance and Sexual Activity   Alcohol Use Yes    Comment: Occasional social drink once a month     Fall Risk Screen:  DADA Fall Risk Assessment has not been completed.    Depression Screenin/8/2022     5:03 PM   PHQ-2/PHQ-9 Depression Screening   Little Interest or Pleasure in Doing Things 0-->not at all   Feeling Down, Depressed or Hopeless 0-->not at all   PHQ-9: Brief Depression Severity Measure Score 0     Health Habits and Functional and Cognitive Screening:      10/30/2020    10:00 AM   Functional & Cognitive Status   Do you have difficulty preparing food and eating? No   Do you have difficulty bathing yourself, getting dressed or grooming yourself? No   Do you have difficulty using the toilet? No   Do you have difficulty moving around from place to place? No   Do you have trouble with steps or getting out of a bed or a chair? No   Current Diet Well Balanced Diet   Dental Exam Not up to date   Eye Exam Up to date   Exercise (times per week) 3 times per week   Current Exercises Include Walking   Do you need help using the phone?  No   Are you deaf or do you have serious difficulty hearing?  No   Do you need help to go to places out of walking distance? No   Do you need help shopping? No   Do you need help preparing meals?  No   Do you need help with housework?  No   Do you need help with laundry? No   Do you need help taking your medications? No   Do you need help managing money? No   Do you ever drive or ride in a car without wearing a seat belt? No   Have you felt unusual stress, anger or  loneliness in the last month? No   Who do you live with? Spouse   If you need help, do you have trouble finding someone available to you? No   Have you been bothered in the last four weeks by sexual problems? No   Do you have difficulty concentrating, remembering or making decisions? No             Age-appropriate Screening Schedule:  Refer to the list below for future screening recommendations based on patient's age, sex and/or medical conditions. Orders for these recommended tests are listed in the plan section. The patient has been provided with a written plan.    Health Maintenance List  Health Maintenance   Topic Date Due    TDAP/TD VACCINES (1 - Tdap) Never done    Pneumococcal Vaccine 65+ (1 of 1 - PCV) Never done    MAMMOGRAM  12/07/2022    COVID-19 Vaccine (3 - 2023-24 season) 09/01/2023    ANNUAL WELLNESS VISIT  12/08/2023    BMI FOLLOWUP  07/11/2024    LIPID PANEL  07/13/2024    INFLUENZA VACCINE  08/01/2024    DXA SCAN  12/29/2024    COLORECTAL CANCER SCREENING  03/29/2026    HEPATITIS C SCREENING  Completed                                                                                                                                                CMS Preventative Services Quick Reference  Risk Factors Identified During Encounter  None Identified    The above risks/problems have been discussed with the patient.  Pertinent information has been shared with the patient in the After Visit Summary.  An After Visit Summary and PPPS were made available to the patient.    Follow Up:   Next Medicare Wellness visit to be scheduled in 1 year.         Additional E&M Note during same encounter follows:  Patient has additional, significant, and separately identifiable condition(s)/problem(s) that require work above and beyond the Medicare Wellness Visit

## 2024-07-18 LAB
BASOPHILS # BLD AUTO: 0 X10E3/UL (ref 0–0.2)
BASOPHILS NFR BLD AUTO: 1 %
BUN SERPL-MCNC: 13 MG/DL (ref 8–27)
BUN/CREAT SERPL: 16 (ref 12–28)
CALCIUM SERPL-MCNC: 9.3 MG/DL (ref 8.7–10.3)
CHLORIDE SERPL-SCNC: 100 MMOL/L (ref 96–106)
CHOLEST SERPL-MCNC: 204 MG/DL (ref 100–199)
CO2 SERPL-SCNC: 23 MMOL/L (ref 20–29)
CREAT SERPL-MCNC: 0.81 MG/DL (ref 0.57–1)
EGFRCR SERPLBLD CKD-EPI 2021: 76 ML/MIN/1.73
EOSINOPHIL # BLD AUTO: 0.1 X10E3/UL (ref 0–0.4)
EOSINOPHIL NFR BLD AUTO: 2 %
ERYTHROCYTE [DISTWIDTH] IN BLOOD BY AUTOMATED COUNT: 13.9 % (ref 11.7–15.4)
GLUCOSE SERPL-MCNC: 98 MG/DL (ref 70–99)
HCT VFR BLD AUTO: 34.8 % (ref 34–46.6)
HDLC SERPL-MCNC: 50 MG/DL
HGB BLD-MCNC: 11.5 G/DL (ref 11.1–15.9)
IMM GRANULOCYTES # BLD AUTO: 0 X10E3/UL (ref 0–0.1)
IMM GRANULOCYTES NFR BLD AUTO: 0 %
LDLC SERPL CALC-MCNC: 132 MG/DL (ref 0–99)
LYMPHOCYTES # BLD AUTO: 2.2 X10E3/UL (ref 0.7–3.1)
LYMPHOCYTES NFR BLD AUTO: 32 %
MCH RBC QN AUTO: 27.9 PG (ref 26.6–33)
MCHC RBC AUTO-ENTMCNC: 33 G/DL (ref 31.5–35.7)
MCV RBC AUTO: 85 FL (ref 79–97)
MONOCYTES # BLD AUTO: 0.5 X10E3/UL (ref 0.1–0.9)
MONOCYTES NFR BLD AUTO: 8 %
NEUTROPHILS # BLD AUTO: 4 X10E3/UL (ref 1.4–7)
NEUTROPHILS NFR BLD AUTO: 57 %
PLATELET # BLD AUTO: 304 X10E3/UL (ref 150–450)
POTASSIUM SERPL-SCNC: 4.5 MMOL/L (ref 3.5–5.2)
RBC # BLD AUTO: 4.12 X10E6/UL (ref 3.77–5.28)
SODIUM SERPL-SCNC: 137 MMOL/L (ref 134–144)
TRIGL SERPL-MCNC: 123 MG/DL (ref 0–149)
VLDLC SERPL CALC-MCNC: 22 MG/DL (ref 5–40)
WBC # BLD AUTO: 6.9 X10E3/UL (ref 3.4–10.8)

## 2024-10-21 ENCOUNTER — OFFICE VISIT (OUTPATIENT)
Dept: SLEEP MEDICINE | Facility: HOSPITAL | Age: 74
End: 2024-10-21
Payer: MEDICARE

## 2024-10-21 VITALS
DIASTOLIC BLOOD PRESSURE: 78 MMHG | HEIGHT: 63 IN | WEIGHT: 163 LBS | BODY MASS INDEX: 28.88 KG/M2 | HEART RATE: 75 BPM | OXYGEN SATURATION: 96 % | SYSTOLIC BLOOD PRESSURE: 153 MMHG

## 2024-10-21 DIAGNOSIS — G47.33 OSA (OBSTRUCTIVE SLEEP APNEA): Primary | ICD-10-CM

## 2024-10-21 PROCEDURE — G0463 HOSPITAL OUTPT CLINIC VISIT: HCPCS

## 2024-10-21 RX ORDER — GABAPENTIN 100 MG/1
100 CAPSULE ORAL NIGHTLY
Qty: 30 CAPSULE | Refills: 2 | Status: SHIPPED | OUTPATIENT
Start: 2024-10-21

## 2024-10-21 NOTE — PROGRESS NOTES
[facility]                          Chief Complaint:   F/up LALO, RLS    History of present illness:   Subjective     LAOL:   HST 11/17/20: YONATHAN=10/h; SpO2=84%; Hypoxic burden=2 min; JACQUE= 10/h.   PAP titration 2/3/2023: CPAP 8 reached. AHI=0.    She uses her CPAP regularly.      Mask: NP which does fit well.  She has significant air leak but no dry mouth.  DME: [facility]      Sleep schedule:  -Bedtime: 1:00 AM  -Wake up time: 10:00 AM , does feel refreshed  -Nocturnal awakening: 3 times because of nocturia.  No difficulties going back to sleep.    ESS: Total score: 1     RLS:  On prior visit, she describes symptoms of painful RLS usually in the morning when she is about to fall asleep and at that time her sleep schedule was shifted toward early morning.  She previously has improved with oxycodone/hydrocodone.  However she was placed on Mirapex 0.5 mg which helped buy caused Dizziness and therefore it was lowered to 0.25 mg. Now she feels that her pain is back and she's taking Hydrocodone for that. THe restlessness improved overall.     REVIEW OF SYSTEMS:   HEENT: No nasal congestion or postnasal drip   CARDIOVASCULAR: No chest pain, chest pressure or chest discomfort. No palpitations or edema.   RESPIRATORY: No shortness of breath, cough or sputum.   GASTROINTESTINAL: No abdominal bloating or reflux   NEUROLOGICAL/PSYCHOATRY: No depression nor anxiety    Past Medical History:  Past Medical History:   Diagnosis Date    Anesthesia complication     STATES SLOW TO WAKE UP    Bursitis     Depression     approx 2008    Insomnia     Mild sleep apnea     Osteoarthritis     Slow to wake up after anesthesia    ,   Past Surgical History:   Procedure Laterality Date    COLONOSCOPY  2011    Dr. Walsh;     COLONOSCOPY N/A 03/29/2021    Procedure: COLONOSCOPY TO CECUM ;  Surgeon: Hermelinda Walsh MD;  Location[facility] ENDOSCOPY;  Service: General;  Laterality: N/A;  POSITIVE COLOGUARD  --DIVERTICULOSIS,  ANAL PAPILLAE     HYSTERECTOMY  1997    JOINT REPLACEMENT  2015 2018    TOTAL HIP ARTHROPLASTY Right 06/05/2015    hardeep hips   ,   Family History   Problem Relation Age of Onset    Stroke Mother     Deep vein thrombosis Father     Hypertension Father     Diabetes Maternal Grandmother     Stomach cancer Paternal Grandmother     Cancer Sister     Malig Hyperthermia Neg Hx    ,   Social History     Socioeconomic History    Marital status:    Tobacco Use    Smoking status: Never    Smokeless tobacco: Never   Vaping Use    Vaping status: Never Used   Substance and Sexual Activity    Alcohol use: Yes     Comment: Occasional social drink once a month    Drug use: No    Sexual activity: Not Currently     Partners: Male     E-cigarette/Vaping    E-cigarette/Vaping Use Never User     Passive Exposure No     Counseling Given No      E-cigarette/Vaping Substances    Nicotine No     THC No     CBD No     Flavoring No      E-cigarette/Vaping Devices    Disposable No     Pre-filled or Refillable Cartridge No     Refillable Tank No     Pre-filled Pod No          , and Allergies:  Chlorine, Citrullus vulgaris, Daucus carota, Epinephrine, Atorvastatin, Penicillins, Azithromycin, Banana, Cantaloupe (diagnostic), Chocolate, Cinnamon, Clindamycin, Iodinated contrast media, Pineapple, Red dye #40 (allura red), Soybean oil, Tetanus immune globulin, and Tylenol pm extra [diphenhydramine-apap (sleep)]    Medication Review:     Current Outpatient Medications:     estradiol (CLIMARA) 0.0375 MG/24HR, Place 1 patch on the skin as directed by provider 1 (One) Time Per Week., Disp: , Rfl:     famotidine (PEPCID) 40 MG tablet, Take 0.5 tablets by mouth Daily., Disp: 30 tablet, Rfl: 5    HYDROcodone-acetaminophen (NORCO) 5-325 MG per tablet, Take 1 tablet by mouth Every 6 (Six) Hours As Needed for Moderate Pain. Gets 1 script per year, Disp: 30 tablet, Rfl: 0    pramipexole (Mirapex) 0.25 MG tablet, Take 1 tablet by mouth Every Night.,  "Disp: 30 tablet, Rfl: 4    zolpidem (AMBIEN) 10 MG tablet, Take 0.5 tablets by mouth At Night As Needed for Sleep., Disp: 30 tablet, Rfl: 5      Objective   Vital Signs:  Vitals:    10/21/24 1505   BP: 153/78   Pulse: 75   SpO2: 96%   Weight: 73.9 kg (163 lb)   Height: 160 cm (63\")     Body mass index is 28.87 kg/m².          Physical Exam:   General Appearance:    Alert, cooperative, in no acute distress   ENMT:  Freidman score 3, narrow distance in between the posterior pharyngeal pillars    Neck:  Trachea midline. No thyromegaly.   Lungs:     Clear to auscultation,respirations regular, even and  unlabored    Heart:    Regular rhythm and normal rate, normal S1 and S2, no Murmur.   Abdomen:       Soft.  No tenderness.  No HSM    Lymphatics: No palpable cervical or supraclavicular lymph nodes   Integumentary: No rash or ecchymosis   Neuro:   Conscious, alert, oriented x3. Appropriate mood and affect.    Extremities:   Moves all extremities well, no edema, no cyanosis, no Redness              Diagnostic data:      CPAP download showed:  Date: last 90 days  Usage (days): 98%  Days used>4h: 77 %  AHI: 0.2/h  Leak 95%: 12   Usage: 6 h and 5 min  P 95% : 10  Auto CPAP: 4-16 cm H2O    Lab Results   Component Value Date    HGBA1C 5.5 12/08/2022     Triglycerides   Date Value Ref Range Status   07/17/2024 123 0 - 149 mg/dL Final     HDL Cholesterol   Date Value Ref Range Status   07/17/2024 50 >39 mg/dL Final     Hemoglobin   Date Value Ref Range Status   07/17/2024 11.5 11.1 - 15.9 g/dL Final   09/18/2018 10.8 (L) 12.0 - 16.0 g/dL Final     Total CO2   Date Value Ref Range Status   07/17/2024 23 20 - 29 mmol/L Final   09/18/2018 22 22 - 30 mmol/L Final        Assessment   LALO  RLS  Anxiety/depression  Overweight, BMI 28        PLAN:  Discussed the result of the download.   Patient is compliant with therapy and clinically benefit from treatment.  Patient was encouraged to continue using PAP.  Refill supplies. Change the " cushion every month.     Start Neurontin 100 mg 2 hours before bedtime for painful RLS. Stop Mirapex.     Counseled for weight loss.  Encouraged to exercise regularly and cut down on carbohydrates.  Discussed that losing weight may decrease the severity of sleep apnea and obviate the need of CPAP therapy.    RTC 3 months.               This note was dictated utilizing Dragon dictation

## 2024-10-30 ENCOUNTER — OFFICE VISIT (OUTPATIENT)
Dept: FAMILY MEDICINE CLINIC | Facility: CLINIC | Age: 74
End: 2024-10-30
Payer: MEDICARE

## 2024-10-30 VITALS
OXYGEN SATURATION: 97 % | HEART RATE: 64 BPM | HEIGHT: 63 IN | DIASTOLIC BLOOD PRESSURE: 74 MMHG | WEIGHT: 164.4 LBS | BODY MASS INDEX: 29.13 KG/M2 | SYSTOLIC BLOOD PRESSURE: 118 MMHG

## 2024-10-30 DIAGNOSIS — M79.605 PAIN IN BOTH LOWER EXTREMITIES: Primary | ICD-10-CM

## 2024-10-30 DIAGNOSIS — M79.604 PAIN IN BOTH LOWER EXTREMITIES: Primary | ICD-10-CM

## 2024-10-30 PROCEDURE — 99213 OFFICE O/P EST LOW 20 MIN: CPT | Performed by: NURSE PRACTITIONER

## 2024-10-30 PROCEDURE — 1125F AMNT PAIN NOTED PAIN PRSNT: CPT | Performed by: NURSE PRACTITIONER

## 2024-10-30 NOTE — PROGRESS NOTES
Subjective   Malissa Silva is a 74 y.o. female. Presents today for   Chief Complaint   Patient presents with    Leg Pain     Bilateral  Around ankle   No swelling  X's years getting much worse   Taken Hydrocodone for past 6 nights   Does want to have to do that       History Of Present Illness Malissa Silva is a 74-year-old female presenting today with pain in both legs    GERD: Famotidine  HRT: Climara  Insomnia: Ambien  Neuropathy: Gabapentin, hydrocodone  Weight loss: Mirapex    Care gaps:  Influenza  COVID  DEXA scan  History of Present Illness  The patient is a 74-year-old female who presents for evaluation of leg pain.    She has been managing her leg pain with Mirapex for the past year, which generally provides relief during the night. However, over the past week, Mirapex has been ineffective, and she has had to take hydrocodone every night. She is concerned about running out of hydrocodone and becoming dependent on it. The pain typically starts around 10 or 11 PM and intensifies to the point where she cannot bear it, prompting her to take hydrocodone. The pain subsides after taking hydrocodone, allowing her to sleep without any hangover effects the next day. She has been able to sleep without hydrocodone for the past three nights, but the pain wakes her up in the middle of the night. She has run out of her Mirapex prescription and has been trying to get a refill. She did not realize the extent of relief Mirapex provided until she stopped taking it. She took her last dose of Mirapex last Monday night.    The pain is severe, often accompanied by chest tightness, and is only alleviated by hydrocodone. It is located in her ankles, has been present for 10 years, and occasionally radiates down to her feet. She has tried soaking her feet in Epsom salt, which provides minimal relief. She has not had any x-rays of her ankles.    She also experiences hot flashes and sweats, which she can manage. She stopped taking  estrogen a week ago due to concerns about a cyst. She has a mammogram and ultrasound scheduled for tomorrow.    ALLERGIES  She is allergic to GABAPENTIN.    Patient Active Problem List   Diagnosis    Arthritis of hand    Coxitis    Muscle strain of chest wall    Chronic depression    Fatigue    Insomnia    Obstructive sleep apnea syndrome    Antinuclear factor positive    Restless legs syndrome    Scapulocostal syndrome    Increased frequency of urination    Status post total replacement of right hip    Encounter for monitoring primary estrogen replacement therapy    Radiculopathy of leg    Lateral femoral cutaneous neuropathy, right    Left hip pain    Primary osteoarthritis of left hip    Lipid screening    Medication monitoring encounter    Somatic dysfunction    Injury to fingernail of right hand    Positive colorectal cancer screening using DNA-based stool test    Left hip pain       Social History     Socioeconomic History    Marital status:    Tobacco Use    Smoking status: Never    Smokeless tobacco: Never   Vaping Use    Vaping status: Never Used   Substance and Sexual Activity    Alcohol use: Yes     Comment: Occasional social drink once a month    Drug use: No    Sexual activity: Not Currently     Partners: Male       Allergies   Allergen Reactions    Chlorine Shortness Of Breath    Citrullus Vulgaris Other (See Comments)     Per allergy testing    Daucus Carota Itching    Epinephrine Dizziness     Syncope immediately after dental injection    Atorvastatin Irritability     Caused severe agitation    Penicillins Hives    Azithromycin Rash    Banana Palpitations and Rash     Per allergy testing    Cantaloupe (Diagnostic) Palpitations and Rash     Per allergy testing    Chocolate Palpitations and Rash     Per allergy testing    Cinnamon Palpitations and Rash     Per allergy testing    Clindamycin Rash    Iodinated Contrast Media Other (See Comments)     Refuses them as afraid of reaction as she thinks  "this has red dye, has never received iodine contrast injection    Pineapple Palpitations and Rash     Per allergy testing    Red Dye #40 (Allura Red) Rash and Mental Status Change     Allergic to any meds w/ red, pink, lavender, orange dye.    Soybean Oil Itching    Tetanus Immune Globulin Other (See Comments)     fever    Tylenol Pm Extra [Diphenhydramine-Apap (Sleep)] Irritability       Current Outpatient Medications on File Prior to Visit   Medication Sig Dispense Refill    HYDROcodone-acetaminophen (NORCO) 5-325 MG per tablet Take 1 tablet by mouth Every 6 (Six) Hours As Needed for Moderate Pain. Gets 1 script per year 30 tablet 0    zolpidem (AMBIEN) 10 MG tablet Take 0.5 tablets by mouth At Night As Needed for Sleep. 30 tablet 5     No current facility-administered medications on file prior to visit.       Objective   Vitals:    10/30/24 1146   BP: 118/74   Pulse: 64   SpO2: 97%   Weight: 74.6 kg (164 lb 6.4 oz)   Height: 160 cm (63\")     Body mass index is 29.12 kg/m².    Physical Exam    Physical Exam  Constitutional:       Appearance: Normal appearance.   HENT:      Head: Normocephalic and atraumatic.      Nose: Nose normal.      Mouth/Throat:      Mouth: Mucous membranes are moist.   Eyes:      Pupils: Pupils are equal, round, and reactive to light.   Cardiovascular:      Rate and Rhythm: Normal rate and regular rhythm.      Pulses: Normal pulses.      Heart sounds: Normal heart sounds.   Pulmonary:      Effort: Pulmonary effort is normal.      Breath sounds: Normal breath sounds.   Abdominal:      General: Bowel sounds are normal.   Musculoskeletal:         General: No signs of injury.      Cervical back: Normal range of motion.      Comments: Bilateral lower extremities are painful   Skin:     General: Skin is warm and dry.      Capillary Refill: Capillary refill takes less than 2 seconds.   Neurological:      General: No focal deficit present.      Mental Status: She is alert.   Psychiatric:         " Mood and Affect: Mood normal.         Results         Procedures     Assessment & Plan   Diagnoses and all orders for this visit:    1. Pain in both lower extremities (Primary)  -     pramipexole (Mirapex) 0.5 MG tablet; Take 1 tablet by mouth every night at bedtime.  Dispense: 90 tablet; Refill: 3         Assessment & Plan  1. Leg pain.  The symptoms suggest a neuropathic origin, possibly due to age-related degeneration or a pinched nerve in the spinal column. She reports that Mirapex has been effective in managing her leg pain and allowing her to sleep. A prescription for Mirapex was provided. She is allergic to gabapentin, so alternative treatments are limited. If symptoms persist despite Mirapex, further evaluation may be necessary.    2. Medication Management.  She has been taking hydrocodone for breakthrough pain when Mirapex is not effective. She is concerned about running out of hydrocodone and the potential for dependency. She will continue to use hydrocodone as needed until Mirapex is resumed.    3. Cyst in breast.  She reports a cyst in her breast and has a mammogram and ultrasound scheduled for tomorrow. The cyst is likely benign, possibly related to dense breast tissue. She recently stopped her estrogen patch, suspecting it might be related to the cyst. Further management will depend on the results of the mammogram and ultrasound.    4. Hot flashes.  She has been experiencing hot flashes, which may be related to discontinuing her estrogen patch. This symptom is less concerning to her compared to her leg pain. If hot flashes persist and become bothersome, hormone replacement therapy may be reconsidered.                   Return in about 6 months (around 4/30/2025).     Patient or patient representative verbalized consent for the use of Ambient Listening during the visit with  ALLISON Torres for chart documentation. 10/30/24  12:30 EST

## 2024-10-31 ENCOUNTER — APPOINTMENT (OUTPATIENT)
Dept: WOMENS IMAGING | Facility: HOSPITAL | Age: 74
End: 2024-10-31
Payer: MEDICARE

## 2024-10-31 ENCOUNTER — TELEPHONE (OUTPATIENT)
Dept: FAMILY MEDICINE CLINIC | Facility: CLINIC | Age: 74
End: 2024-10-31
Payer: MEDICARE

## 2024-10-31 PROCEDURE — 76642 ULTRASOUND BREAST LIMITED: CPT | Performed by: RADIOLOGY

## 2024-10-31 PROCEDURE — 77066 DX MAMMO INCL CAD BI: CPT | Performed by: RADIOLOGY

## 2024-10-31 PROCEDURE — G0279 TOMOSYNTHESIS, MAMMO: HCPCS | Performed by: RADIOLOGY

## 2024-10-31 NOTE — TELEPHONE ENCOUNTER
Caller: Malissa Silva    Relationship: Self    Best call back number: [phone]     What medication are you requesting: pramipexole (Mirapex) 0.25 MG     If a prescription is needed, what is your preferred pharmacy and phone number: [id number] - [place], [place] - [address] AT [facility] [address] [facility] [phone] [facility] [phone] FX     Additional notes: PATIENT STATED SHE HAS BEEN OUT OF THIS MEDICATION FOR A WEEK.    PATIENT STATED DR CONKLIN WILL NOT REFILL THIS PRESCRIPTION EVEN THOUGH HE SAID HE WOULD.    PATIENT STATED SHE ASKED DAVID ANTHONY TO CALL THIS IN FOR HER WHEN SHE WAS IN OFFICE ON 10-,  HOWEVER HER PHARMACY HAS NOT RECEIVED A PRESCRIPTION.    PATIENT STATED WHEN SHE DOES NOT HAVE THIS MEDICATION SHE HAS TO TAKE HYDROCODONE, WHICH SHE DOES NOT LIKE TAKING.    PATIENT IS REQUESTING THIS BE SENT TO HER PHARMACY [facility].

## 2024-11-01 RX ORDER — PRAMIPEXOLE DIHYDROCHLORIDE 0.25 MG/1
0.25 TABLET ORAL DAILY
Qty: 90 TABLET | Refills: 3 | Status: SHIPPED | OUTPATIENT
Start: 2024-11-01

## 2024-11-07 DIAGNOSIS — G25.81 RESTLESS LEGS SYNDROME (RLS): Primary | ICD-10-CM

## 2024-11-08 RX ORDER — PRAMIPEXOLE DIHYDROCHLORIDE 0.5 MG/1
0.5 TABLET ORAL
Qty: 90 TABLET | Refills: 3 | Status: SHIPPED | OUTPATIENT
Start: 2024-11-08

## 2025-01-04 NOTE — TELEPHONE ENCOUNTER
Caller: Malissa Silva    Relationship: Self    Best call back number: [phone] (H)    Requested Prescriptions:   Requested Prescriptions     Pending Prescriptions Disp Refills   • traZODone (DESYREL) 100 MG tablet 30 tablet 0     Sig: Take 1 tablet by mouth Every Night.   • HYDROcodone-acetaminophen (NORCO) 5-325 MG per tablet 30 tablet 0     Sig: Take 1 tablet by mouth Every 6 (Six) Hours As Needed for Moderate Pain. Gets 1 script per year        Pharmacy where request should be sent: [facility] #[id number] - [facility] [address] AT [facility] [address] [facility] - [phone]  - [phone] FX     Additional details provided by patient: PATIENT IS A PRIOR [employer] PATIENT    NEXT OV 12/08/22 WITH RANCHO BISHOP     PATIENT HAS 3 HYDROCODONE LEFT AND 5 TRAZADONE LEFT    THE HYDROCODONE HASN'T BEEN FILLED SINCE 2020, ITS FOR OCCASIONAL USE.     PLEASE CALL PATIENT BACK WITH THIS REQUEST, SO SHE CAN GET SOME DIRECTION ON HOW TO GO ABOUT GETTING THESE     Does the patient have less than a 3 day supply:  [x] Yes  [] No    Lindsey Peng, PCT   09/26/22 12:52 EDT     
Hydrocodone last filled 07/09/21  Last ov 03/22  Next ov 12/22  
full weight-bearing

## 2025-01-19 NOTE — PROGRESS NOTES
Subjective   Malissa Silva is a 74 y.o. female. Presents today for   Chief Complaint   Patient presents with    Follow-up       History Of Present Illness    Arthritis    Depression: Mirapex    Insomnia: Zolpidem works sometimes, sometimes not    Obstructive Sleep Apnea    Restless Leg Syndrome - worsening, but the mirapex works    CareGaps:  Influenza refused  Covd - refused    Dexa Scan - refused      History of Present Illness      Patient Active Problem List   Diagnosis    Arthritis of hand    Coxitis    Muscle strain of chest wall    Chronic depression    Fatigue    Insomnia    Obstructive sleep apnea syndrome    Antinuclear factor positive    Restless legs syndrome    Scapulocostal syndrome    Increased frequency of urination    Status post total replacement of right hip    Encounter for monitoring primary estrogen replacement therapy    Radiculopathy of leg    Lateral femoral cutaneous neuropathy, right    Left hip pain    Primary osteoarthritis of left hip    Lipid screening    Medication monitoring encounter    Somatic dysfunction    Injury to fingernail of right hand    Positive colorectal cancer screening using DNA-based stool test    Left hip pain       Social History     Socioeconomic History    Marital status:    Tobacco Use    Smoking status: Never    Smokeless tobacco: Never   Vaping Use    Vaping status: Never Used   Substance and Sexual Activity    Alcohol use: Yes     Comment: Occasional social drink once a month    Drug use: No    Sexual activity: Not Currently     Partners: Male       Allergies   Allergen Reactions    Chlorine Shortness Of Breath    Citrullus Vulgaris Other (See Comments)     Per allergy testing    Daucus Carota Itching    Epinephrine Dizziness     Syncope immediately after dental injection    Atorvastatin Irritability     Caused severe agitation    Penicillins Hives    Azithromycin Rash    Banana Palpitations and Rash     Per allergy testing    Cantaloupe (Diagnostic)  "Palpitations and Rash     Per allergy testing    Chocolate Palpitations and Rash     Per allergy testing    Cinnamon Palpitations and Rash     Per allergy testing    Clindamycin Rash    Iodinated Contrast Media Other (See Comments)     Refuses them as afraid of reaction as she thinks this has red dye, has never received iodine contrast injection    Pineapple Palpitations and Rash     Per allergy testing    Red Dye #40 (Allura Red) Rash and Mental Status Change     Allergic to any meds w/ red, pink, lavender, orange dye.    Soybean Oil Itching    Tetanus Immune Globulin Other (See Comments)     fever    Tylenol Pm Extra [Diphenhydramine-Apap (Sleep)] Irritability       Current Outpatient Medications on File Prior to Visit   Medication Sig Dispense Refill    estradiol (CLIMARA) 0.0375 MG/24HR Place 1 patch on the skin as directed by provider 1 (One) Time Per Week.      HYDROcodone-acetaminophen (NORCO) 5-325 MG per tablet Take 1 tablet by mouth Every 6 (Six) Hours As Needed for Moderate Pain. Gets 1 script per year 30 tablet 0    pramipexole (Mirapex) 0.5 MG tablet Take 1 tablet by mouth every night at bedtime. 90 tablet 3    zolpidem (AMBIEN) 10 MG tablet Take 0.5 tablets by mouth At Night As Needed for Sleep. 30 tablet 5    [DISCONTINUED] gabapentin 50 MG tablet Take 100 mg by mouth every night at bedtime. 30 tablet 2     No current facility-administered medications on file prior to visit.       Objective   Vitals:    01/20/25 1337   BP: 130/70   Pulse: 67   SpO2: 96%   Weight: 75.2 kg (165 lb 12.8 oz)   Height: 160 cm (63\")     Body mass index is 29.37 kg/m².    Physical Exam    Physical Exam  Constitutional:       Appearance: She is obese.   HENT:      Head: Normocephalic and atraumatic.   Cardiovascular:      Rate and Rhythm: Normal rate and regular rhythm.      Pulses: Normal pulses.      Heart sounds: Normal heart sounds.   Pulmonary:      Effort: Pulmonary effort is normal.      Breath sounds: Normal breath " sounds.   Neurological:      Mental Status: She is alert.         Results         Procedures     Assessment & Plan   Diagnoses and all orders for this visit:    1. Follow-up exam (Primary)    2. Restless legs syndrome    3. Other insomnia    Other orders  -     Cancel: CBC & Differential  -     Cancel: Comprehensive Metabolic Panel  -     Cancel: Lipid Panel    1-2. Legs are worsening with pain, but Mirapex still effective.   3.  Insomnia - Zolpidem works most of the time, but has issues once in awhile where she only sleeps 3 hours.  Other Orders:  patient would prefer to do on her annual visit.  Assessment & Plan         Body mass index is 29.37 kg/m².    Discussed Care Gaps, ordered referrals and encouraged vaccination updates.       - Pt agrees with plan of care and denies further questions/concerns today  - This document is intended for medical expert use only. Persons  reading this document without medical staff guidance may result in misinterpretation and unintended morbidity     Go to the ER for any possible life-threatening symptoms such as chest pain or shortness of air.      Please allow 3-5 business days for recommendations based on new results      I personally spent time with this patient, preparing for the visit, reviewing tests, obtaining and/or reviewing a separately obtained history, performing a medically appropriate examination and/or evaluation, counseling and educating the patient/family/caregiver, ordering medications,  documenting information in the medical record and indepentently interpreting results.               Return in about 6 months (around 7/20/2025) for Annual physical.          Answers submitted by the patient for this visit:  Primary Reason for Visit (Submitted on 1/20/2025)  What is the primary reason for your visit?: Problem Not Listed  Problem not listed (Submitted on 1/20/2025)  Chief Complaint: Other medical problem  Reason for appointment: follow up  abdominal pain:  No  anorexia: No  joint pain: No  change in stool: No  chest pain: No  chills: No  nasal congestion: No  cough: No  diaphoresis: No  fatigue: No  fever: No  headaches: No  joint swelling: No  myalgias: Yes  nausea: No  neck pain: No  numbness: No  rash: No  sore throat: No  swollen glands: No  dysuria: No  vertigo: No  visual change: No  vomiting: No  weakness: No  Onset: at an unknown time

## 2025-01-20 ENCOUNTER — OFFICE VISIT (OUTPATIENT)
Dept: FAMILY MEDICINE CLINIC | Facility: CLINIC | Age: 75
End: 2025-01-20
Payer: MEDICARE

## 2025-01-20 VITALS
SYSTOLIC BLOOD PRESSURE: 130 MMHG | BODY MASS INDEX: 29.38 KG/M2 | HEIGHT: 63 IN | OXYGEN SATURATION: 96 % | WEIGHT: 165.8 LBS | DIASTOLIC BLOOD PRESSURE: 70 MMHG | HEART RATE: 67 BPM

## 2025-01-20 DIAGNOSIS — Z09 FOLLOW-UP EXAM: Primary | ICD-10-CM

## 2025-01-20 DIAGNOSIS — G25.81 RESTLESS LEGS SYNDROME: ICD-10-CM

## 2025-01-20 DIAGNOSIS — G47.09 OTHER INSOMNIA: ICD-10-CM

## 2025-01-20 PROCEDURE — 99214 OFFICE O/P EST MOD 30 MIN: CPT | Performed by: NURSE PRACTITIONER

## 2025-01-20 PROCEDURE — 1125F AMNT PAIN NOTED PAIN PRSNT: CPT | Performed by: NURSE PRACTITIONER

## 2025-01-20 RX ORDER — ESTRADIOL 0.04 MG/D
1 PATCH TRANSDERMAL WEEKLY
COMMUNITY

## 2025-02-12 DIAGNOSIS — F51.01 PRIMARY INSOMNIA: ICD-10-CM

## 2025-02-12 RX ORDER — ZOLPIDEM TARTRATE 10 MG/1
5 TABLET ORAL NIGHTLY PRN
Qty: 30 TABLET | Refills: 5 | Status: SHIPPED | OUTPATIENT
Start: 2025-02-12

## 2025-02-12 NOTE — TELEPHONE ENCOUNTER
Caller: Ricardo Malissa POWELL    Relationship: Self    Best call back number: [phone]     Requested Prescriptions:   Requested Prescriptions     Pending Prescriptions Disp Refills    zolpidem (AMBIEN) 10 MG tablet 30 tablet 5     Sig: Take 0.5 tablets by mouth At Night As Needed for Sleep.        Pharmacy where request should be sent: [facility] #[id number] [facility] [address] AT [facility] [address] [facility] [phone] [facility] [phone] FX     Last office visit with prescribing clinician: 1/20/2025   Last telemedicine visit with prescribing clinician: Visit date not found   Next office visit with prescribing clinician: 7/21/2025     Does the patient have less than a 3 day supply:  [x] Yes  [] No    Would you like a call back once the refill request has been completed: [] Yes [x] No    If the office needs to give you a call back, can they leave a voicemail: [] Yes [x] No    Christian Damon Rep   02/12/25 12:20 EST

## 2025-05-05 ENCOUNTER — APPOINTMENT (OUTPATIENT)
Dept: WOMENS IMAGING | Facility: HOSPITAL | Age: 75
End: 2025-05-05
Payer: MEDICARE

## 2025-05-05 PROCEDURE — 76642 ULTRASOUND BREAST LIMITED: CPT | Performed by: RADIOLOGY

## 2025-05-07 ENCOUNTER — OFFICE VISIT (OUTPATIENT)
Dept: FAMILY MEDICINE CLINIC | Facility: CLINIC | Age: 75
End: 2025-05-07
Payer: MEDICARE

## 2025-05-07 VITALS
DIASTOLIC BLOOD PRESSURE: 68 MMHG | WEIGHT: 166.4 LBS | HEIGHT: 63 IN | SYSTOLIC BLOOD PRESSURE: 122 MMHG | OXYGEN SATURATION: 95 % | BODY MASS INDEX: 29.48 KG/M2 | HEART RATE: 68 BPM

## 2025-05-07 DIAGNOSIS — R00.2 PALPITATIONS: Primary | ICD-10-CM

## 2025-05-07 DIAGNOSIS — Z13.220 LIPID SCREENING: ICD-10-CM

## 2025-05-07 DIAGNOSIS — R06.02 SHORTNESS OF BREATH: ICD-10-CM

## 2025-05-07 DIAGNOSIS — M54.10 RADICULOPATHY OF LEG: ICD-10-CM

## 2025-05-07 PROCEDURE — 1125F AMNT PAIN NOTED PAIN PRSNT: CPT | Performed by: NURSE PRACTITIONER

## 2025-05-07 PROCEDURE — 99213 OFFICE O/P EST LOW 20 MIN: CPT | Performed by: NURSE PRACTITIONER

## 2025-05-07 RX ORDER — PRAMIPEXOLE DIHYDROCHLORIDE 0.25 MG/1
1 TABLET ORAL DAILY
COMMUNITY
Start: 2025-01-30

## 2025-05-07 RX ORDER — HYDROCODONE BITARTRATE AND ACETAMINOPHEN 5; 325 MG/1; MG/1
1 TABLET ORAL EVERY 6 HOURS PRN
Qty: 30 TABLET | Refills: 0 | Status: SHIPPED | OUTPATIENT
Start: 2025-05-07

## 2025-05-07 NOTE — PROGRESS NOTES
Subjective   Malissa Silva is a 74 y.o. female. Presents today for   Chief Complaint   Patient presents with    Palpitations     Awakens at night with Heart beating fast  Has to take deep breaths because having trouble catching breath       History Of Present Illness Malissa Silva is a 74-year-old female presenting today with heart palpitations she was advised to go to the emergency room in by [facility] and she declined    History of Present Illness  The patient is a 74-year-old female who presents for evaluation of palpitations and leg pain. She is accompanied by her .    She has been experiencing palpitations for approximately one month. She uses a CPAP machine at night and initially suspected it was malfunctioning due to episodes of waking up with a rapid heart rate. However, the CPAP provider confirmed the device's functionality and suggested anxiety as a potential cause. She questions why this anxiety would only manifest during sleep and not during waking hours. This morning, she removed her CPAP mask, returned to sleep, and upon waking, experienced a racing heart and difficulty taking a deep breath. She continues to struggle with sleep, particularly when waking up short of breath. She has observed that her symptoms are exacerbated when sleeping on her left side compared to her right. Additionally, she reports occasional dry mouth upon waking, with her lips adhering to her gums.    She requests a refill of her hydrocodone prescription, as she only has one dose remaining. She recently attended a retreat where she did not require the medication despite increased food intake. She continues to experience intermittent severe leg pain, which is typically managed with pramipexole. However, there are instances where the pain intensifies even after taking pramipexole. She has tried Restful Legs and Calm supplements, which provided initial relief but became less effective over time. She is considering resuming these  supplements in conjunction with pramipexole.    MEDICATIONS  Current: CPAP, hydrocodone, pramipexole    Patient Active Problem List   Diagnosis    Arthritis of hand    Coxitis    Muscle strain of chest wall    Chronic depression    Fatigue    Insomnia    Obstructive sleep apnea syndrome    Antinuclear factor positive    Restless legs syndrome    Scapulocostal syndrome    Increased frequency of urination    Status post total replacement of right hip    Encounter for monitoring primary estrogen replacement therapy    Radiculopathy of leg    Lateral femoral cutaneous neuropathy, right    Left hip pain    Primary osteoarthritis of left hip    Lipid screening    Medication monitoring encounter    Somatic dysfunction    Injury to fingernail of right hand    Positive colorectal cancer screening using DNA-based stool test    Left hip pain       Social History     Socioeconomic History    Marital status:    Tobacco Use    Smoking status: Never    Smokeless tobacco: Never   Vaping Use    Vaping status: Never Used   Substance and Sexual Activity    Alcohol use: Yes     Alcohol/week: 1.0 standard drink of alcohol     Types: 1 Drinks containing 0.5 oz of alcohol per week     Comment: Occasional social drink once a month or less    Drug use: No    Sexual activity: Not Currently     Partners: Male     Birth control/protection: Post-menopausal       Allergies   Allergen Reactions    Chlorine Shortness Of Breath    Citrullus Vulgaris Other (See Comments)     Per allergy testing    Daucus Carota Itching    Epinephrine Dizziness     Syncope immediately after dental injection    Atorvastatin Irritability     Caused severe agitation    Penicillins Hives    Azithromycin Rash    Banana Palpitations and Rash     Per allergy testing    Cantaloupe (Diagnostic) Palpitations and Rash     Per allergy testing    Chocolate Palpitations and Rash     Per allergy testing    Cinnamon Palpitations and Rash     Per allergy testing    Clindamycin  "Rash    Iodinated Contrast Media Other (See Comments)     Refuses them as afraid of reaction as she thinks this has red dye, has never received iodine contrast injection    Pineapple Palpitations and Rash     Per allergy testing    Red Dye #40 (Allura Red) Rash and Mental Status Change     Allergic to any meds w/ red, pink, lavender, orange dye.    Soybean Oil Itching    Tetanus Immune Globulin Other (See Comments)     fever    Tylenol Pm Extra [Diphenhydramine-Apap (Sleep)] Irritability       Current Outpatient Medications on File Prior to Visit   Medication Sig Dispense Refill    estradiol (CLIMARA) 0.0375 MG/24HR Place 1 patch on the skin as directed by provider 1 (One) Time Per Week.      pramipexole (MIRAPEX) 0.25 MG tablet Take 1 tablet by mouth Daily.      pramipexole (Mirapex) 0.5 MG tablet Take 1 tablet by mouth every night at bedtime. 90 tablet 3    zolpidem (AMBIEN) 10 MG tablet Take 0.5 tablets by mouth At Night As Needed for Sleep. 30 tablet 5    [DISCONTINUED] HYDROcodone-acetaminophen (NORCO) 5-325 MG per tablet Take 1 tablet by mouth Every 6 (Six) Hours As Needed for Moderate Pain. Gets 1 script per year 30 tablet 0     No current facility-administered medications on file prior to visit.       Objective   Vitals:    05/07/25 1259   BP: 122/68   Pulse: 68   SpO2: 95%   Weight: 75.5 kg (166 lb 6.4 oz)   Height: 160 cm (63\")     Body mass index is 29.48 kg/m².    Physical Exam    Physical Exam  HENT:      Head: Normocephalic and atraumatic.      Nose: Nose normal.   Cardiovascular:      Rate and Rhythm: Normal rate and regular rhythm.      Pulses: Normal pulses.      Heart sounds: Normal heart sounds.   Pulmonary:      Effort: Pulmonary effort is normal.      Breath sounds: Normal breath sounds.   Abdominal:      General: Abdomen is flat. Bowel sounds are normal.      Palpations: Abdomen is soft.   Neurological:      General: No focal deficit present.      Mental Status: She is alert. "       Results  Testing  EKG was normal.  EKG NSR  No previous for comparison    Vent Rate 61 BPM  WV interval 177 ms  QRS duration 85 ms   QT/Qtc  407/411 ms  P-R-T axes 40 43 59    Procedures     Assessment & Plan   Diagnoses and all orders for this visit:    1. Palpitations (Primary)  -     CBC & Differential  -     Comprehensive Metabolic Panel    2. Lipid screening  -     Lipid Panel    3. Radiculopathy of leg  -     HYDROcodone-acetaminophen (NORCO) 5-325 MG per tablet; Take 1 tablet by mouth Every 6 (Six) Hours As Needed for Moderate Pain. Gets 1 script per year  Dispense: 30 tablet; Refill: 0    4. Shortness of breath       4.  Elevate head of bed while sleeping to see if this alleviates any symptoms.  Assessment & Plan  1. Palpitations.  Her EKG results were within normal limits. She was advised to elevate the head of her bed to an angle of 30 to 45 degrees to potentially alleviate her symptoms. If symptoms persist, further evaluation will be considered.    2. Leg pain.  A prescription for hydrocodone was provided. She was also advised to continue taking pramipexole and consider using Calm supplements to manage her symptoms.                 Return if symptoms worsen or fail to improve.     Discussed Care Gaps, ordered referrals and encouraged vaccination updates.       - Pt agrees with plan of care and denies further questions/concerns today  - This document is intended for medical expert use only. Persons  reading this document without medical staff guidance may result in misinterpretation and unintended morbidity     Go to the ER for any possible life-threatening symptoms such as chest pain or shortness of air.      Please allow 3-5 business days for recommendations based on new results      I personally spent time with this patient, preparing for the visit, reviewing tests, obtaining and/or reviewing a separately obtained history, performing a medically appropriate examination and/or evaluation,  counseling and educating the patient/family/caregiver, ordering medications,  documenting information in the medical record and indepentently interpreting results.         Patient or patient representative verbalized consent for the use of Ambient Listening during the visit with  ALLISON Torres for chart documentation. 5/7/2025  13:40 EDT     Answers submitted by the patient for this visit:  Problem not listed (Submitted on 5/7/2025)  Chief Complaint: Other medical problem  abdominal pain: No  anorexia: No  joint pain: Yes  change in stool: No  chest pain: No  chills: No  nasal congestion: No  cough: No  diaphoresis: No  fatigue: Yes  fever: No  headaches: No  joint swelling: No  myalgias: Yes  nausea: No  neck pain: No  numbness: No  rash: No  sore throat: No  swollen glands: No  dysuria: No  vertigo: No  visual change: No  vomiting: No  weakness: No  Onset: 1 to 6 months  Chronicity: recurrent  Frequency: daily  Medications tried: none

## 2025-05-25 DIAGNOSIS — F51.01 PRIMARY INSOMNIA: ICD-10-CM

## 2025-05-27 RX ORDER — ZOLPIDEM TARTRATE 10 MG/1
5 TABLET ORAL NIGHTLY PRN
Qty: 15 TABLET | Refills: 5 | Status: SHIPPED | OUTPATIENT
Start: 2025-05-27

## 2025-06-06 ENCOUNTER — OFFICE VISIT (OUTPATIENT)
Dept: FAMILY MEDICINE CLINIC | Facility: CLINIC | Age: 75
End: 2025-06-06
Payer: MEDICARE

## 2025-06-06 VITALS
WEIGHT: 171 LBS | HEIGHT: 63 IN | OXYGEN SATURATION: 94 % | BODY MASS INDEX: 30.3 KG/M2 | TEMPERATURE: 98.1 F | DIASTOLIC BLOOD PRESSURE: 80 MMHG | SYSTOLIC BLOOD PRESSURE: 166 MMHG | HEART RATE: 68 BPM

## 2025-06-06 DIAGNOSIS — J06.9 UPPER RESPIRATORY TRACT INFECTION, UNSPECIFIED TYPE: Primary | ICD-10-CM

## 2025-06-06 DIAGNOSIS — J02.9 SORE THROAT: ICD-10-CM

## 2025-06-06 LAB
EXPIRATION DATE: ABNORMAL
EXPIRATION DATE: NORMAL
FLUAV AG UPPER RESP QL IA.RAPID: NOT DETECTED
FLUBV AG UPPER RESP QL IA.RAPID: NOT DETECTED
INTERNAL CONTROL: ABNORMAL
INTERNAL CONTROL: NORMAL
Lab: ABNORMAL
Lab: NORMAL
S PYO AG THROAT QL: NEGATIVE
SARS-COV-2 AG UPPER RESP QL IA.RAPID: NOT DETECTED

## 2025-06-06 PROCEDURE — 87428 SARSCOV & INF VIR A&B AG IA: CPT | Performed by: NURSE PRACTITIONER

## 2025-06-06 PROCEDURE — 1125F AMNT PAIN NOTED PAIN PRSNT: CPT | Performed by: NURSE PRACTITIONER

## 2025-06-06 PROCEDURE — 99213 OFFICE O/P EST LOW 20 MIN: CPT | Performed by: NURSE PRACTITIONER

## 2025-06-06 PROCEDURE — 87880 STREP A ASSAY W/OPTIC: CPT | Performed by: NURSE PRACTITIONER

## 2025-06-06 RX ORDER — PREDNISONE 10 MG/1
TABLET ORAL
Qty: 32 TABLET | Refills: 0 | Status: SHIPPED | OUTPATIENT
Start: 2025-06-06

## 2025-06-06 NOTE — PROGRESS NOTES
Subjective   Malissa Silva is a 74 y.o. female. Presents today for   Chief Complaint   Patient presents with    Sore Throat     X's  5 days         OTC Cold and Flu meds   no help    Ear Stopped up       History Of Present Illness    History of Present Illness  The patient presents for evaluation of a sore throat.    She has been experiencing a persistent sore throat for the past week, which is particularly severe upon awakening in the morning. Despite self-administration of COVID-19 and influenza medications, there has been no improvement in her symptoms. She has also attempted to alleviate her symptoms with the use of a neti pot, but this has not provided significant relief. She has not used Flonase as a treatment option. She reports no known exposure to sick individuals but mentions recent contact with two-year-old children. She expresses concern about the potential progression of her current symptoms into a lung infection, as she has previously experienced a similar situation that resulted in a two-week illness. She also reports experiencing fatigue, body aches, and chills. She has a history of COVID-19 infection approximately one year ago.    Additionally, she reports an elevated blood pressure reading, which she attributes to excessive use of Benadryl the previous night.    MEDICATIONS  Current: Benadryl    Patient Active Problem List   Diagnosis    Arthritis of hand    Coxitis    Muscle strain of chest wall    Chronic depression    Fatigue    Insomnia    Obstructive sleep apnea syndrome    Antinuclear factor positive    Restless legs syndrome    Scapulocostal syndrome    Increased frequency of urination    Status post total replacement of right hip    Encounter for monitoring primary estrogen replacement therapy    Radiculopathy of leg    Lateral femoral cutaneous neuropathy, right    Left hip pain    Primary osteoarthritis of left hip    Lipid screening    Medication monitoring encounter    Somatic  dysfunction    Injury to fingernail of right hand    Positive colorectal cancer screening using DNA-based stool test    Left hip pain       Social History     Socioeconomic History    Marital status:    Tobacco Use    Smoking status: Never    Smokeless tobacco: Never   Vaping Use    Vaping status: Never Used   Substance and Sexual Activity    Alcohol use: Yes     Alcohol/week: 1.0 standard drink of alcohol     Types: 1 Drinks containing 0.5 oz of alcohol per week     Comment: Occasional social drink once a month or less    Drug use: No    Sexual activity: Not Currently     Partners: Male     Birth control/protection: Post-menopausal       Allergies   Allergen Reactions    Chlorine Shortness Of Breath    Citrullus Vulgaris Other (See Comments)     Per allergy testing    Daucus Carota Itching    Epinephrine Dizziness     Syncope immediately after dental injection    Atorvastatin Irritability     Caused severe agitation    Penicillins Hives    Azithromycin Rash    Banana Palpitations and Rash     Per allergy testing    Cantaloupe (Diagnostic) Palpitations and Rash     Per allergy testing    Chocolate Palpitations and Rash     Per allergy testing    Cinnamon Palpitations and Rash     Per allergy testing    Clindamycin Rash    Iodinated Contrast Media Other (See Comments)     Refuses them as afraid of reaction as she thinks this has red dye, has never received iodine contrast injection    Pineapple Palpitations and Rash     Per allergy testing    Red Dye #40 (Allura Red) Rash and Mental Status Change     Allergic to any meds w/ red, pink, lavender, orange dye.    Soybean Oil Itching    Tetanus Immune Globulin Other (See Comments)     fever    Tylenol Pm Extra [Diphenhydramine-Apap (Sleep)] Irritability       Current Outpatient Medications on File Prior to Visit   Medication Sig Dispense Refill    estradiol (CLIMARA) 0.0375 MG/24HR Place 1 patch on the skin as directed by provider 1 (One) Time Per Week.       "HYDROcodone-acetaminophen (NORCO) 5-325 MG per tablet Take 1 tablet by mouth Every 6 (Six) Hours As Needed for Moderate Pain. Gets 1 script per year 30 tablet 0    pramipexole (MIRAPEX) 0.25 MG tablet Take 1 tablet by mouth Daily.      pramipexole (Mirapex) 0.5 MG tablet Take 1 tablet by mouth every night at bedtime. 90 tablet 3    zolpidem (AMBIEN) 10 MG tablet Take 0.5 tablets by mouth At Night As Needed for Sleep. 15 tablet 5     No current facility-administered medications on file prior to visit.       Objective   Vitals:    06/06/25 1412   BP: 166/80   Pulse: 68   Temp: 98.1 °F (36.7 °C)   SpO2: 94%   Weight: 77.6 kg (171 lb)   Height: 160 cm (63\")     Body mass index is 30.29 kg/m².    Physical Exam  There is a little bit of fluid behind the eardrum on both sides. The right ear was cleaned out. There is a little bit of cerumen in the left ear. Throat was examined.  Lungs were auscultated.  Physical Exam    Results         Procedures     Assessment & Plan   Diagnoses and all orders for this visit:    1. Upper respiratory tract infection, unspecified type (Primary)  -     predniSONE (DELTASONE) 10 MG tablet; 6 tabs po qd x 2 days, 4 tabs po qd x 2 days, 3 tabs po qd x 2 days, 2 tabs po qd x 2 days, 1 tab po qd x 2 days  Dispense: 32 tablet; Refill: 0         Assessment & Plan  1. Pharyngitis.  Symptoms suggest a possible middle ear effusion, potentially causing postnasal drip and subsequent throat irritation. The condition could be viral or bacterial in nature. She is advised to continue using the neti pot with sterile or bottled water, ensuring the addition of a salt packet. The use of tap water is discouraged due to the risk of infection. A swab test for COVID-19 and influenza will be conducted. If the results are positive, symptomatic treatment will be initiated. If the results are negative, Flonase will be prescribed to alleviate symptoms. If her condition improves but then deteriorates by next Wednesday, it " may indicate a bacterial infection, necessitating antibiotic therapy.    2. Elevated blood pressure.  The elevated blood pressure may be a rebound effect from taking Benadryl.    PROCEDURE  Cerumen was removed from the left ear using a .                 No follow-ups on file.     Discussed Care Gaps, ordered referrals and encouraged vaccination updates.       - Pt agrees with plan of care and denies further questions/concerns today  - This document is intended for medical expert use only. Persons  reading this document without medical staff guidance may result in misinterpretation and unintended morbidity     Go to the ER for any possible life-threatening symptoms such as chest pain or shortness of air.      Please allow 3-5 business days for recommendations based on new results      I personally spent time with this patient, preparing for the visit, reviewing tests, obtaining and/or reviewing a separately obtained history, performing a medically appropriate examination and/or evaluation, counseling and educating the patient/family/caregiver, ordering medications,  documenting information in the medical record and indepentently interpreting results.         Patient or patient representative verbalized consent for the use of Ambient Listening during the visit with  ALLISON Torres for chart documentation. 6/6/2025  15:08 EDT

## 2025-06-09 ENCOUNTER — TELEPHONE (OUTPATIENT)
Dept: FAMILY MEDICINE CLINIC | Facility: CLINIC | Age: 75
End: 2025-06-09
Payer: MEDICARE

## 2025-06-09 DIAGNOSIS — J30.2 SEASONAL ALLERGIES: ICD-10-CM

## 2025-06-09 DIAGNOSIS — J30.2 SEASONAL ALLERGIES: Primary | ICD-10-CM

## 2025-06-09 RX ORDER — FLUTICASONE PROPIONATE 50 MCG
2 SPRAY, SUSPENSION (ML) NASAL DAILY
Qty: 16 G | Refills: 3 | Status: SHIPPED | OUTPATIENT
Start: 2025-06-09 | End: 2025-06-09 | Stop reason: SDUPTHER

## 2025-06-09 RX ORDER — FLUTICASONE PROPIONATE 50 MCG
2 SPRAY, SUSPENSION (ML) NASAL DAILY
Qty: 16 G | Refills: 3 | Status: SHIPPED | OUTPATIENT
Start: 2025-06-09 | End: 2025-06-12 | Stop reason: SDUPTHER

## 2025-06-09 NOTE — TELEPHONE ENCOUNTER
Caller: [id number] - [place], [place] - [address] AT [facility] - [phone]  - [phone] FX    Relationship: Pharmacy    Best call back number: [phone]     Requested Prescriptions:   FLONASE NOSE SPRAY      Pharmacy where request should be sent: [facility] #[id number] - [place], [place] - [address] AT [facility] [address] [facility] - [phone]  - [phone] FX     Last office visit with prescribing clinician: 6/6/2025   Last telemedicine visit with prescribing clinician: Visit date not found   Next office visit with prescribing clinician: 7/21/2025     Additional details provided by patient: PATIENT IS NEEDING REFILLS ON THIS MEDICATION.     Does the patient have less than a 3 day supply:  [] Yes  [x] No    Would you like a call back once the refill request has been completed: [] Yes [x] No    If the office needs to give you a call back, can they leave a voicemail: [] Yes [x] No    Christian Carbajal   06/09/25 09:00 EDT

## 2025-06-12 DIAGNOSIS — J30.2 SEASONAL ALLERGIES: ICD-10-CM

## 2025-06-12 RX ORDER — FLUTICASONE PROPIONATE 50 MCG
2 SPRAY, SUSPENSION (ML) NASAL DAILY
Qty: 16 G | Refills: 3 | Status: SHIPPED | OUTPATIENT
Start: 2025-06-12

## 2025-07-02 ENCOUNTER — TELEPHONE (OUTPATIENT)
Dept: SLEEP MEDICINE | Facility: HOSPITAL | Age: 75
End: 2025-07-02
Payer: MEDICARE

## 2025-07-02 NOTE — TELEPHONE ENCOUNTER
Pt called stating that she did not feel like she was getting enough air . Would like start pressure increased . Pulled report for Dr to review for recommendations

## 2025-07-28 ENCOUNTER — OFFICE VISIT (OUTPATIENT)
Dept: FAMILY MEDICINE CLINIC | Facility: CLINIC | Age: 75
End: 2025-07-28
Payer: MEDICARE

## 2025-07-28 ENCOUNTER — TELEPHONE (OUTPATIENT)
Dept: SLEEP MEDICINE | Facility: HOSPITAL | Age: 75
End: 2025-07-28
Payer: MEDICARE

## 2025-07-28 VITALS
WEIGHT: 168.6 LBS | HEIGHT: 63 IN | SYSTOLIC BLOOD PRESSURE: 126 MMHG | HEART RATE: 65 BPM | BODY MASS INDEX: 29.88 KG/M2 | DIASTOLIC BLOOD PRESSURE: 70 MMHG | OXYGEN SATURATION: 97 %

## 2025-07-28 DIAGNOSIS — K21.00 GASTROESOPHAGEAL REFLUX DISEASE WITH ESOPHAGITIS WITHOUT HEMORRHAGE: Primary | ICD-10-CM

## 2025-07-28 DIAGNOSIS — F51.01 PRIMARY INSOMNIA: ICD-10-CM

## 2025-07-28 PROCEDURE — 1160F RVW MEDS BY RX/DR IN RCRD: CPT | Performed by: NURSE PRACTITIONER

## 2025-07-28 PROCEDURE — G0439 PPPS, SUBSEQ VISIT: HCPCS | Performed by: NURSE PRACTITIONER

## 2025-07-28 PROCEDURE — 1159F MED LIST DOCD IN RCRD: CPT | Performed by: NURSE PRACTITIONER

## 2025-07-28 PROCEDURE — 99213 OFFICE O/P EST LOW 20 MIN: CPT | Performed by: NURSE PRACTITIONER

## 2025-07-28 PROCEDURE — 1170F FXNL STATUS ASSESSED: CPT | Performed by: NURSE PRACTITIONER

## 2025-07-28 PROCEDURE — 1126F AMNT PAIN NOTED NONE PRSNT: CPT | Performed by: NURSE PRACTITIONER

## 2025-07-28 RX ORDER — SUCRALFATE 1 G/1
1 TABLET ORAL 4 TIMES DAILY
Qty: 120 TABLET | Refills: 0 | Status: SHIPPED | OUTPATIENT
Start: 2025-07-28

## 2025-07-28 RX ORDER — ZOLPIDEM TARTRATE 10 MG/1
10 TABLET ORAL NIGHTLY PRN
Qty: 30 TABLET | Refills: 0 | Status: SHIPPED | OUTPATIENT
Start: 2025-07-28

## 2025-07-28 NOTE — TELEPHONE ENCOUNTER
----- Message from Angeline POWELL sent at 7/22/2025  3:10 PM EDT -----  Regarding: Medication wories    pramipexole (MIRAPEX) 0.25 MG    Worried about long term use, taking for over a year now. Has had persistent cough for over 6 months, is this a side effect?     Is there another med she can take instead?      Dr Armendariz pt

## 2025-07-28 NOTE — PROGRESS NOTES
Subjective   The ABCs of the Annual Wellness Visit  Medicare Wellness Visit      Malissa Silva is a 75 y.o. patient who presents for a Medicare Wellness Visit.    The following portions of the patient's history were reviewed and   updated as appropriate: allergies, current medications, past family history, past medical history, past social history, past surgical history, and problem list.    Compared to one year ago, the patient's physical   health is the same.  Compared to one year ago, the patient's mental   health is the same.    Recent Hospitalizations:  She was not admitted to the hospital during the last year.     Current Medical Providers:  Patient Care Team:  Camila Frey APRN as PCP - General (Family Medicine)    Outpatient Medications Prior to Visit   Medication Sig Dispense Refill    pramipexole (MIRAPEX) 0.25 MG tablet Take 1 tablet by mouth Daily.      estradiol (CLIMARA) 0.0375 MG/24HR Place 1 patch on the skin as directed by provider 1 (One) Time Per Week.      fluticasone (FLONASE) 50 MCG/ACT nasal spray Administer 2 sprays into the nostril(s) as directed by provider Daily. 16 g 3    HYDROcodone-acetaminophen (NORCO) 5-325 MG per tablet Take 1 tablet by mouth Every 6 (Six) Hours As Needed for Moderate Pain. Gets 1 script per year 30 tablet 0    pramipexole (Mirapex) 0.5 MG tablet Take 1 tablet by mouth every night at bedtime. 90 tablet 3    predniSONE (DELTASONE) 10 MG tablet 6 tabs po qd x 2 days, 4 tabs po qd x 2 days, 3 tabs po qd x 2 days, 2 tabs po qd x 2 days, 1 tab po qd x 2 days (Patient not taking: Reported on 7/28/2025) 32 tablet 0    zolpidem (AMBIEN) 10 MG tablet Take 0.5 tablets by mouth At Night As Needed for Sleep. 15 tablet 5     No facility-administered medications prior to visit.     Opioid medication/s are on active medication list.  and I have evaluated her active treatment plan and pain score trends (see table).  Vitals:    07/28/25 1446   PainSc: 0-No pain     I have  "reviewed the chart for potential of high risk medication and harmful drug interactions in the elderly.        Aspirin is not on active medication list.  Aspirin use is not indicated based on review of current medical condition/s. Risk of harm outweighs potential benefits.  .    Patient Active Problem List   Diagnosis    Arthritis of hand    Coxitis    Muscle strain of chest wall    Chronic depression    Fatigue    Insomnia    Obstructive sleep apnea syndrome    Antinuclear factor positive    Restless legs syndrome    Scapulocostal syndrome    Increased frequency of urination    Status post total replacement of right hip    Encounter for monitoring primary estrogen replacement therapy    Radiculopathy of leg    Lateral femoral cutaneous neuropathy, right    Left hip pain    Primary osteoarthritis of left hip    Lipid screening    Medication monitoring encounter    Somatic dysfunction    Injury to fingernail of right hand    Positive colorectal cancer screening using DNA-based stool test    Left hip pain     Advance Care Planning Advance Directive is not on file.  ACP discussion was declined by the patient. Patient does not have an advance directive, declines further assistance.            Objective   Vitals:    07/28/25 1446   BP: 126/70   Pulse: 65   SpO2: 97%   Weight: 76.5 kg (168 lb 9.6 oz)   Height: 160 cm (63\")   PainSc: 0-No pain       Estimated body mass index is 29.87 kg/m² as calculated from the following:    Height as of this encounter: 160 cm (63\").    Weight as of this encounter: 76.5 kg (168 lb 9.6 oz).    BMI is >= 30 and <35. (Class 1 Obesity). The following options were offered after discussion;: weight loss educational material (shared in after visit summary), exercise counseling/recommendations, nutrition counseling/recommendations, and pharmacological intervention options      Does the patient have evidence of cognitive impairment? No                                                                 "                                Health  Risk Assessment    Smoking Status:  Social History     Tobacco Use   Smoking Status Never   Smokeless Tobacco Never     Alcohol Consumption:  Social History     Substance and Sexual Activity   Alcohol Use Yes    Alcohol/week: 1.0 standard drink of alcohol    Types: 1 Drinks containing 0.5 oz of alcohol per week    Comment: Occasional social drink once a month or less       Fall Risk Screen  BH[place]ADI Fall Risk Assessment was completed, and patient is at LOW risk for falls.Assessment completed on:2025    Depression Screening   Little interest or pleasure in doing things? Not at all   Feeling down, depressed, or hopeless? Not at all   PHQ-2 Total Score 0      Health Habits and Functional and Cognitive Screenin/28/2025     2:00 PM   Functional & Cognitive Status   Do you have difficulty preparing food and eating? No   Do you have difficulty bathing yourself, getting dressed or grooming yourself? No   Do you have difficulty using the toilet? No   Do you have difficulty moving around from place to place? No   Do you have trouble with steps or getting out of a bed or a chair? No   Current Diet Well Balanced Diet   Dental Exam Up to date   Eye Exam Up to date   Exercise (times per week) 2 times per week   Current Exercises Include Home Fitness Gym   Do you need help using the phone?  No   Are you deaf or do you have serious difficulty hearing?  No   Do you need help to go to places out of walking distance? No   Do you need help shopping? No   Do you need help preparing meals?  No   Do you need help with housework?  Yes   Do you need help with laundry? No   Do you need help taking your medications? Yes   Do you need help managing money? No   Do you ever drive or ride in a car without wearing a seat belt? No   Have you felt unusual fatigue (could be tiredness), stress, anger or loneliness in the last month? Yes   Who do you live with? Spouse   If you need help, do you have  trouble finding someone available to you? No   Have you been bothered in the last four weeks by sexual problems? No   Do you have difficulty concentrating, remembering or making decisions? No           Age-appropriate Screening Schedule:  Refer to the list below for future screening recommendations based on patient's age, sex and/or medical conditions. Orders for these recommended tests are listed in the plan section. The patient has been provided with a written plan.    Health Maintenance List  Health Maintenance   Topic Date Due    DXA SCAN  12/29/2024    LIPID PANEL  07/17/2025    Pneumococcal Vaccine 50+ (1 of 1 - PCV) 01/24/2026 (Originally 7/7/2000)    TDAP/TD VACCINES (1 - Tdap) 01/24/2026 (Originally 7/7/1969)    COVID-19 Vaccine (3 - 2024-25 season) 01/28/2026 (Originally 9/1/2024)    RSV Vaccine - Adults (1 - 1-dose 75+ series) 07/28/2026 (Originally 7/7/2025)    INFLUENZA VACCINE  10/01/2025    COLORECTAL CANCER SCREENING  03/29/2026    ANNUAL WELLNESS VISIT  07/28/2026    HEPATITIS C SCREENING  Completed    MAMMOGRAM  Discontinued                                                                                                                                                CMS Preventative Services Quick Reference  Risk Factors Identified During Encounter  None Identified    The above risks/problems have been discussed with the patient.  Pertinent information has been shared with the patient in the After Visit Summary.  An After Visit Summary and PPPS were made available to the patient.    Follow Up:   Next Medicare Wellness visit to be scheduled in 1 year.         Additional E&M Note during same encounter follows:  Patient has additional, significant, and separately identifiable condition(s)/problem(s) that require work above and beyond the Medicare Wellness Visit     Chief Complaint  Medicare Wellness-subsequent and Cough (X 6 months)    Subjective    HPI  She continues to have cough that she thinks is  "from GERD.                     Objective   Vital Signs:  /70   Pulse 65   Ht 160 cm (63\")   Wt 76.5 kg (168 lb 9.6 oz)   SpO2 97%   BMI 29.87 kg/m²   Physical Exam  Constitutional:       Appearance: Normal appearance.   HENT:      Head: Normocephalic and atraumatic.      Right Ear: Tympanic membrane, ear canal and external ear normal.      Left Ear: Tympanic membrane, ear canal and external ear normal.      Mouth/Throat:      Mouth: Mucous membranes are moist.   Cardiovascular:      Rate and Rhythm: Normal rate and regular rhythm.      Pulses: Normal pulses.      Heart sounds: Normal heart sounds.   Pulmonary:      Effort: Pulmonary effort is normal.      Breath sounds: Normal breath sounds.   Neurological:      General: No focal deficit present.      Mental Status: She is alert.      Comments: CN II-XII grossly intact on exam AEB following finger with eyes, puffing cheeks, sticking out tongue, wiggling tongue, raising eyebrows, and shrugging shoulders.   Patient can touch finger to nose, finger to provider's finger x 3 without difficulty.  She can hold her arms outstretched with eyes closed and no pronator drift.                       Results             Assessment and Plan    Diagnoses and all orders for this visit:    1. Gastroesophageal reflux disease with esophagitis without hemorrhage (Primary)  -     sucralfate (Carafate) 1 g tablet; Take 1 tablet by mouth 4 (Four) Times a Day.  Dispense: 120 tablet; Refill: 0    2. Primary insomnia  -     zolpidem (AMBIEN) 10 MG tablet; Take 1 tablet by mouth At Night As Needed for Sleep.  Dispense: 30 tablet; Refill: 0                   Follow Up   Return in about 6 months (around 1/28/2026) for Next scheduled follow up.  Patient was given instructions and counseling regarding her condition or for health maintenance advice. Please see specific information pulled into the AVS if appropriate.  Patient or patient representative verbalized consent for the use of " Ambient Listening during the visit with  ALLISON Torres for chart documentation. 7/28/2025  19:02 EDT

## 2025-08-27 DIAGNOSIS — K21.00 GASTROESOPHAGEAL REFLUX DISEASE WITH ESOPHAGITIS WITHOUT HEMORRHAGE: ICD-10-CM

## 2025-08-27 RX ORDER — SUCRALFATE 1 G/1
1 TABLET ORAL 4 TIMES DAILY
Qty: 120 TABLET | Refills: 0 | Status: SHIPPED | OUTPATIENT
Start: 2025-08-27

## (undated) DEVICE — THE TORRENT IRRIGATION SCOPE CONNECTOR IS USED WITH THE TORRENT IRRIGATION TUBING TO PROVIDE IRRIGATION FLUIDS SUCH AS STERILE WATER DURING GASTROINTESTINAL ENDOSCOPIC PROCEDURES WHEN USED IN CONJUNCTION WITH AN IRRIGATION PUMP (OR ELECTROSURGICAL UNIT).: Brand: TORRENT

## (undated) DEVICE — KT ORCA ORCAPOD DISP STRL

## (undated) DEVICE — LN SMPL CO2 SHTRM SD STREAM W/M LUER

## (undated) DEVICE — SENSR O2 OXIMAX FNGR A/ 18IN NONSTR

## (undated) DEVICE — CANN O2 ETCO2 FITS ALL CONN CO2 SMPL A/ 7IN DISP LF

## (undated) DEVICE — ADAPT CLN BIOGUARD AIR/H2O DISP

## (undated) DEVICE — TUBING, SUCTION, 1/4" X 10', STRAIGHT: Brand: MEDLINE